# Patient Record
Sex: MALE | Race: BLACK OR AFRICAN AMERICAN | NOT HISPANIC OR LATINO | Employment: FULL TIME | URBAN - METROPOLITAN AREA
[De-identification: names, ages, dates, MRNs, and addresses within clinical notes are randomized per-mention and may not be internally consistent; named-entity substitution may affect disease eponyms.]

---

## 2021-05-23 ENCOUNTER — APPOINTMENT (EMERGENCY)
Dept: RADIOLOGY | Facility: HOSPITAL | Age: 31
End: 2021-05-23

## 2021-05-23 ENCOUNTER — APPOINTMENT (EMERGENCY)
Dept: CT IMAGING | Facility: HOSPITAL | Age: 31
End: 2021-05-23

## 2021-05-23 ENCOUNTER — HOSPITAL ENCOUNTER (EMERGENCY)
Facility: HOSPITAL | Age: 31
Discharge: HOME | End: 2021-05-23
Attending: EMERGENCY MEDICINE | Admitting: EMERGENCY MEDICINE

## 2021-05-23 VITALS
RESPIRATION RATE: 16 BRPM | OXYGEN SATURATION: 98 % | TEMPERATURE: 97.6 F | HEIGHT: 70 IN | HEART RATE: 70 BPM | WEIGHT: 235 LBS | BODY MASS INDEX: 33.64 KG/M2 | DIASTOLIC BLOOD PRESSURE: 71 MMHG | SYSTOLIC BLOOD PRESSURE: 123 MMHG
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
## 2021-05-23 DIAGNOSIS — T07.XXXA MULTIPLE ABRASIONS: ICD-10-CM

## 2021-05-23 DIAGNOSIS — S82.851A CLOSED RIGHT TRIMALLEOLAR FRACTURE, INITIAL ENCOUNTER: Primary | ICD-10-CM

## 2021-05-23 PROCEDURE — 73700 CT LOWER EXTREMITY W/O DYE: CPT

## 2021-05-23 PROCEDURE — 73610 X-RAY EXAM OF ANKLE: CPT

## 2021-05-23 PROCEDURE — 99285 EMERGENCY DEPT VISIT HI MDM: CPT | Performed by: EMERGENCY MEDICINE

## 2021-05-23 PROCEDURE — 99285 EMERGENCY DEPT VISIT HI MDM: CPT

## 2021-05-23 PROCEDURE — 71045 X-RAY EXAM CHEST 1 VIEW: CPT

## 2021-05-23 PROCEDURE — 76705 ECHO EXAM OF ABDOMEN: CPT | Performed by: EMERGENCY MEDICINE

## 2021-05-23 PROCEDURE — 93308 TTE F-UP OR LMTD: CPT | Performed by: EMERGENCY MEDICINE

## 2021-05-23 PROCEDURE — 29515 APPLICATION SHORT LEG SPLINT: CPT | Performed by: EMERGENCY MEDICINE

## 2021-05-23 RX ORDER — GINSENG 100 MG
2 CAPSULE ORAL ONCE
Status: COMPLETED | OUTPATIENT
Start: 2021-05-23 | End: 2021-05-23

## 2021-05-23 RX ORDER — OXYCODONE HYDROCHLORIDE AND ACETAMINOPHEN 5; 325 MG/1; MG/1
1 TABLET ORAL ONCE
Status: COMPLETED | OUTPATIENT
Start: 2021-05-23 | End: 2021-05-23

## 2021-05-23 RX ORDER — OXYCODONE HYDROCHLORIDE AND ACETAMINOPHEN 5; 325 MG/1; MG/1
1 TABLET ORAL EVERY 6 HOURS PRN
Qty: 15 TABLET | Refills: 0 | Status: SHIPPED | OUTPATIENT
Start: 2021-05-23 | End: 2021-06-03

## 2021-05-23 RX ORDER — IBUPROFEN 600 MG/1
600 TABLET ORAL ONCE
Status: COMPLETED | OUTPATIENT
Start: 2021-05-23 | End: 2021-05-23

## 2021-05-23 RX ORDER — IBUPROFEN 200 MG
800 TABLET ORAL 2 TIMES DAILY
COMMUNITY
End: 2021-06-07 | Stop reason: HOSPADM

## 2021-05-23 RX ADMIN — BACITRACIN 2 SMALL APPLICATION: 500 OINTMENT TOPICAL at 09:36

## 2021-05-23 RX ADMIN — OXYCODONE HYDROCHLORIDE AND ACETAMINOPHEN 1 TABLET: 5; 325 TABLET ORAL at 10:05

## 2021-05-23 RX ADMIN — IBUPROFEN 600 MG: 600 TABLET, FILM COATED ORAL at 09:01

## 2021-05-23 NOTE — Clinical Note
Grace Jay was seen and treated in our emergency department on 5/23/2021  No work until cleared by Family Doctor/Orthopedics        Diagnosis:     Pk    He may return on this date: If you have any questions or concerns, please don't hesitate to call        Yanira Reynaga, DO    ______________________________           _______________          _______________  Hospital Representative                              Date                                Time
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
## 2021-05-23 NOTE — DISCHARGE INSTRUCTIONS
You can take ibuprofen for pain    A prescription for Percocet is also available for breakthrough severe pain

## 2021-05-28 NOTE — TELEPHONE ENCOUNTER
Margrett Duverney was seen in the ER on May 23rd and was referred to Dr Keyna Bejarano  The phone room placed him with Dr Cherlyn Severin so he could get in sooner, but unfortunately Dr Cherlyn Severin does not see this kind of fracture and he needs to be seen by Dr Kenya Bejarano or a Trauma Doctor  The mistake of placing this Pt with Cherlyn Severin was realized days ago, but unfortunately the phone number in the chart was incorrect  We were able to find him eventually and correct it, but now he is out many days since being in the ER  Is there a way that you can get this PT in with Dr Kenya Bejarano on 6/1 at the [address] [place] [address]? The PT is made aware that they will get a call to schedule  Thank you!

## 2021-06-01 NOTE — TELEPHONE ENCOUNTER
Patient scheduled for 6/3 @ 745am in the [facility] office to see Dr Citlalli Vazquez

## 2021-06-03 VITALS
WEIGHT: 235 LBS | BODY MASS INDEX: 33.64 KG/M2 | HEIGHT: 70 IN | DIASTOLIC BLOOD PRESSURE: 80 MMHG | SYSTOLIC BLOOD PRESSURE: 130 MMHG

## 2021-06-03 DIAGNOSIS — Z71.6 ENCOUNTER FOR SMOKING CESSATION COUNSELING: ICD-10-CM

## 2021-06-03 DIAGNOSIS — S82.871A TRAUMATIC CLOSED DISPLACED FRACTURE OF TIBIAL PLAFOND, RIGHT, INITIAL ENCOUNTER: Primary | ICD-10-CM

## 2021-06-03 PROCEDURE — 99243 OFF/OP CNSLTJ NEW/EST LOW 30: CPT | Performed by: ORTHOPAEDIC SURGERY

## 2021-06-03 RX ORDER — CHLORHEXIDINE GLUCONATE 4 G/100ML
SOLUTION TOPICAL DAILY PRN
Status: CANCELLED | OUTPATIENT
Start: 2021-06-03

## 2021-06-03 RX ORDER — CEFAZOLIN SODIUM 2 G/50ML
2000 SOLUTION INTRAVENOUS ONCE
Status: CANCELLED | OUTPATIENT
Start: 2021-06-07 | End: 2021-06-03

## 2021-06-03 NOTE — LETTER
Jennifer 3, 2021     Breanne Snow DO  [address]    Patient: Laura Torres   YOB: 1990   Date of Visit: 6/3/2021       Dear Dr Brooklyn Rush: Thank you for referring Laura Torres to me for evaluation  Below are my notes for this consultation  If you have questions, please do not hesitate to call me  I look forward to following your patient along with you  Sincerely,        Cliff Perry MD        CC: No Recipients  Cliff Perry MD  6/3/2021  8:19 AM  Signed        VAHE Andrade  Attending, Orthopaedic Surgery  Foot and Ankle  [facility]        ORTHOPAEDIC FOOT AND ANKLE CLINIC VISIT-ANKLE FRACTURE     Assessment:     Encounter Diagnoses   Name Primary?  Closed trimalleolar fracture of right ankle, initial encounter     Encounter for smoking cessation counseling     Traumatic closed displaced fracture of tibial plafond, right, initial encounter Yes              Plan:   · The patient verbalized understanding of exam findings and treatment plan  We engaged in the shared decision-making process and treatment options were discussed at length with the patient  Surgical and conservative management discussed today along with risks and benefits  · The patient has an unstable tibial plafond fracture which is amenable to surgical fixation  · Consent signed in clinic today  · Discussed importance of smoking cessation to reduce risk of complications  · We will plan for surgery at the earliest mutually convenient time  · See back 3 weeks postop for suture removal and transition from splint to CAM boot    CONSENT FOR ANKLE FRACTURE OPEN REDUCTION INTERNAL FIXATION (ORIF): We have discussed the procedure with the patient in detail, including fixation of the fibula with a plate and screws as well as possible need for deltoid ligament repair and/or syndesmotic screw fixation    Potential syndesmotic screw breakage was explained as an anticipated sequela as well  Patient understands that there is no guarantee that the surgery will relieve all of their pain and also understands that there may be a prolonged course of protected weight-bearing status required which will restrict them from driving and other activities as discussed at today's visit  Patient recognizes that there are risks with surgery including bleeding, numbness, nerve irritation, wound complications, infection, continued pain, joint stiffness, malunion, nonunion, anesthetic complications, death, failure of procedure, development of arthritis and possible need for further surgery  The patient understands that there is no guarantee that this surgery will relieve all of His pain and symptoms  Patient understands that there is no guarantee that they will return to full function after the procedure  Patient has provided informed consent for the procedure  History of Present Illness:   Chief Complaint: Right tibial pilon fracture  Wilhelmena Prader is a 27 y o  male who is being seen for  right ankle injury  He sustained the injury 10 days ago  Pain is localized at diffuse ankle with minimal radiating and described as sharp and severe  Patient denies numbness, tingling or radicular pain  Denies history of neuropathy  Patient does smoke, does not have diabetes and does not take blood thinners  Patient denies family history of anesthesia complications and has not had any complications with anesthesia       Pain/symptom timing:  Worse during the day when active  Pain/symptom context:  Worse with activites and work  Pain/symptom modifying factors:  Rest makes better, activities make worse  Pain/symptom associated signs/symptoms: none    Prior treatment   · NSAIDsNo    · Injections No   · Bracing/Orthotics Yes   · Physical Therapy No     Orthopedic Surgical History:   See below    Past Medical, Surgical and Social History:  Past Medical History:  has no past medical history on file   Problem List: does not have any pertinent problems on file  Past Surgical History:  has no past surgical history on file  Family History: family history is not on file  Social History:  reports that he has been smoking cigarettes  He has been smoking about 1 00 pack per day  He has never used smokeless tobacco  He reports current alcohol use  He reports current drug use  Drug: Marijuana  Current Medications: has a current medication list which includes the following prescription(s): ibuprofen  Allergies: has No Known Allergies  Review of Systems:  General- denies fever/chills  HEENT- denies hearing loss or sore throat  Eyes- denies eye pain or visual disturbances, denies red eyes  Respiratory- denies cough or SOB  Cardio- denies chest pain or palpitations  GI- denies abdominal pain  Endocrine- denies urinary frequency  Urinary- denies pain with urination  Musculoskeletal- Negative except noted above  Skin- denies rashes or wounds  Neurological- denies dizziness or headache  Psychiatric- denies anxiety or difficulty concentrating    Physical Exam:   /80   Ht 5' 10" (1 778 m)   Wt 107 kg (235 lb)   BMI 33 72 kg/m²   General/Constitutional: No apparent distress: well-nourished and well developed  Eyes: normal ocular motion  Cardio: RRR, Normal S1S2, No m/r/g  Lymphatic: No appreciable lymphadenopathy  Respiratory: Non-labored breathing, CTA b/l no w/c/r  Vascular: No edema, swelling or tenderness, except as noted in detailed exam   Integumentary: No impressive skin lesions present, except as noted in detailed exam   Neuro: No ataxia or tremors noted  Psych: Normal mood and affect, oriented to person, place and time  Appropriate affect  Musculoskeletal: Normal, except as noted in detailed exam and in HPI      Examination    right    Gait Not assessed due to unstable ankle fracture   Musculoskeletal Tender to palpation at fracture site    Skin What can be seen in the splint is Normal       Nails Normal    Range of Motion  Not assessed due to unstable ankle fracture    Stability Unstable    Muscle Strength N/A tibialis anterior  N/A gastrocnemius-soleus  N/A posterior tibialis  N/A peroneal/eversion strength  5/5 EHL  5/5 FHL    Neurologic Normal    Sensation  Intact to light touch throughout sural, saphenous, superficial peroneal, deep peroneal and medial/lateral plantar nerve distributions  [place] 5 07 filament (10g) testing deferred  Cardiovascular Brisk capillary refill < 2 seconds,intact DP and PT pulses    Special Tests None      Imaging Studies:   3 views of the  right ankle were taken, reviewed and interpreted independently that demonstrate Posterior tibial plafond fracture with associated fibula and medial malleolus  Previous clubfoot sequelae noted  Reviewed by me personally  CT right ankle- consistent with above and in addition, 4th MT avulsion, medial cuneiform avulsion fracture dorsally  Reviewed by me personally  Scribe Attestation    I,:  Alvarado Zeng PA-C am acting as a scribe while in the presence of the attending physician :       I,:  Caryl Mccain MD personally performed the services described in this documentation    as scribed in my presence :             Sherral Mires Lachman, MD  Foot & Ankle Surgery   Department of [address] [place] [address]      I personally performed the service  Sherral Mires Lachman, MD

## 2021-06-03 NOTE — PATIENT INSTRUCTIONS
VAHE Green  Attending, [address] [place] [address] Office Phone: [phone] ? Fax: [phone]  [address] [place] [address] Office Phone: [phone] ? UZR:[phone]    : Pipo Keller) [place], [place]     Surgery Coordinators Carla Lorado: Valeria Mendoza, [address] [facility], [phone]  Surgery Coordinator Yandel:  Ashwini Garnett 32, [phone]  www [facility] [email or web address]   PRE-OPERATIVE AND POST-OPERATIVE INSTRUCTIONS    General Information:   Your surgery is with Dr Garret Nuno  Dates can change (although rare) depending on emergencies   Typical post operative visits are at the following intervals:  3 weeks post surgery(except 1 week for bunions and wound monitoring), 6 weeks post surgery, 3 months post surgery, 6 months post surgery, and then on a yearly basis  However, this may change based on Dr Anushka De Jesus recommendation   #1 post-operative rule for foot/ankle surgery:  ONCE YOU ARE OUT OF YOUR CAST AND/OR REMOVABLE BOOT, SWELLING MAY PERSIST FOR MANY MONTHS  YOU MIGHT ALSO EXPERIENCE A BLUISH DISCOLORATION OF YOUR LEG  THIS IS NORMAL AND PART OF THE USUAL POSTOPERATIVE EXPERIENCE  SMOKING:   Smoking results in incomplete healing of fractures (broken bones) and joints that my have been fused  Smoking and nicotine also prevents the growth of bone into ankle replacements and bone healing  It also slows the healing of muscles and skin (soft tissue)  Therefore, please do not have surgery if you continue to smoke  We reserve the right to cancel your surgery if we suspect that you are smoking  DO NOT use nicorette gum or other patches  Please find an alternative method to quit smoking before your surgery  Pre-Operative Information:   Surgery date and preoperative visits:  a   If you have medical problems, such as an abnormal EKG, history of BLOOD CLOT, ANEURYSM, and any other heart condition, please inform us so that we can get your medical clearance several weeks before the surgery  Please bring any important medical information, such as an EKG, chest x-ray, or echocardiogram, with you to ensure that your surgery will not be delayed  b  If needed, you will receive your preoperative appointments in the mail or by phone from our scheduling office  The location of the preoperative appointment will be given to you also   c  You may not eat after midnight the night before surgery  If you do, your surgery will be cancelled  d   Karo Jewell will receive a phone call from your surgery center the day before your surgery (if your surgery is on a Monday, you will get a call the Friday before)  If you do not hear from someone by 4pm the day before your surgery, please call the Surgical coordinator (number above) to notify us   e  Start taking Vitamin D3 4000 units per day and Calcium 1200mg per day immediately  You will continue this until your 3 month post-op visit  These are over the counter and available at all pharmacies and supermarkets  f  FOR THOSE HAVING SURGERY AT [address] [place] [address] WILL NEED CRUTCHES OR A ROLLING WALKER AFTER SURGERY, ASK FOR A PRESCRIPTION FOR THIS FROM OUR OFFICE TODAY  THIS CANNOT BE HANDLED THE DAY OF SURGERY AS [facility] DOES NOT STOCK THESE   Because bacterial can often enter any defect in the skin, it is important to avoid any cuts before surgery  Any breaks in the skin on the leg will often result in your surgery being postponed  Please avoid going on a very long walk the day prior to surgery, or doing other activities that could lead to irritation of the skin, including yard work, extra athletic activity, or shaving  This could result in surgery cancellation   You MUST be fasting the day of your surgery    Therefore, please do not consume any foot or beverage after midnight the night before surgery  The morning of surgery you may take your usual medications with a sip of water   It is important not to take anti-inflammatory medication like Ibuprofen, Motrin, Naproxen (Aleve), or Aspirin 7-10 days before surgery because they will make you bleed more than usual   Vitamin, E, Plavix and Coumadin also have the same effect  Stop Aspirin and Vitamin E two weeks before surgery  YOUR MEDICAL DOCTOR SHOULD TELL YOU WHEN TO STOP COUMADIN OR PLAVIX   If your surgery involves any bone healing, please do not take anti-inflammatories for at least 6 weeks after surgery  This can impede bone healing (ibuprofen, Aleve, Relafen, iodine)  Tylenol is fine to take  PREOPERATIVE BATHING INSTRUCTIONS:     Before your surgery, bathe with Hibiclens (4% Chlorhexidene) as instructed below  This skin cleanser will help reduce the bacteria on your skin before surgery  To avoid irritating your eyes, do not apply Hibiclens above the level of your neck   o On the evening before AND the morning of surgery, bathe your entire body except the face and scalp, then rinse freely  o DO NOT apply to your face or scalp, as Hibiclens can irritate your eyes   Purchasing information:   Hibiclens is available without a prescription at [facility]  ADDITIONAL INSTRUCTIONS:  PATIENTS HAVING FOOT/ANKLE SURGERY     In preparation for your upcoming surgery, we kindly request and advise the following:   Notify our office if you are taking any of the following:  Coumadin (warfarin):  Persantine (dipyridamole); Pletal (cilostazol); Plavix (clopidogrel); Ticlid (ticlopidine); Agrylin (anagrelide); Aggrenox (dipyridamole and aspirin) or other blood thinners,   In addition, stop taking Vitamin E and herbal supplements   Do not schedule any elective dental work for at least 6 months after surgery    If you had an ankle replacement, you will need to take antibiotics before any future dental procedures  Your dentist or our office can prescribe these for you  1000mg of Amoxicillin 1 hour prior to any dental procedure is the recommended dosing  THREE RULES:    1  After surgery you will most likely be given the instructions KEEP YOUR TOES ABOVE YOUR NOSE    This means that you MUST have your feet elevated higher than your heart  Keeping your toes above your nose helps to heal the muscles and skin (soft tissues) by reducing swelling in your leg  This position also helps to prevent infection, and is very important in avoiding deep venous thrombosis (blood clots)  2  In order to keep the blood circulating in your legs and in order to avoid deep vein   thrombosis (blood clots), we ask patients to GET UP ONCE AN HOUR during the day  This means you should at least cross the room and come back  It does not mean you have to be up for long periods of time  In most cases we will not have people immediately put any weight on their operated part  This is important to prevent loosening of metal or other devices holding the bones together  It also prevents irritation of the soft tissues which can lead to prolonged healing  When we say get up once an hour, please walk, hop or move with an assisted device  This is important! 3  Do not do any excessive walking during the first few days after surgery  Recovering from surgery is a full-time task for the patient  Postoperative care is important to avoid irritating the skin incision, which can lead to infection  Please do not plan activities or go out of town for several weeks after surgery  If you are unsure about your future activities, please schedule surgery only when you know it is acceptable for you  Scheduling surgery and then canceling the date, prevents other people from having surgery on that date as it takes time to line everything up effectively    If you cancel your surgery the week of your planned surgery, we reserve the right to cancel all future surgical procedures  THE DAY OF SURGERY:     Arrival to the hospital or outpatient surgical center on time is imperative  If you arrive late, then your surgery will be cancelled  You MUST have a family member/friend bring you, stay with you throughout the DURATION of your surgery, and drive you home   You MUST be fasting the day of your surgery  Therefore, do not consume any food or beverage after midnight the night before surgery  At your pre-operative visit with the anesthesia staff, or during your phone screen, a nurse will instruct you what medications you will need to take the day of surgery   MAKE SURE THAT THE PHARMACY LISTED IN THE ELECTRONIC MEDICAL RECORD (EPIC) IS YOUR PREFERRED PHARMACY  For example, if you are staying with family or a friend, and will not be near your preferred pharmacy, YOU MUST, tell the nurses checking you in the day of surgery so that this can be changed in the system  If your prescriptions are sent to a pharmacy, this cannot be changed  AFTER YOUR SURGERY:   Bleeding through the bandage almost always occurs  Do not let this alarm you  Simply add more gauze or a towel, call us, and come in for a dressing change  If you think it is excessive, contact us immediately or go to the local emergency room   Do not get the bandage wet  Showering is possible with plastic protectors  Be very careful, as the bathroom can be wet and slippery  If you do get your dressing wet, it should be changed immediately  Please contact us   ONCE YOUR ARE OUT OF YOUR CAST AND/OR REMOVABLE BOOT, SWELLING MAY PERSIST FOR MANY MONTHS  YOU MIGHT ALSO EXPERIENCE A BLUISH DISCOLORATION OF YOUR LEG  THIS IS NORMAL AND PART OF THE USUAL POSTOPERATIVE EXPERIENCE  WEARING COMPRESSION HOSE (ELASTIC STOCKINGS) CAN HELP AVOID SOME OF THIS SWELLING        DRESSING:   The purpose of the surgical dressing is to keep your wound and the surgical site protected from the environment  Most dressings contain splints, which help to hold your foot and ankle in a corrected position, and also allow the surgical site to heal properly  Dressings will remain in place and undisturbed until the first postop visit  If you have a drain in place, this will need to be removed in 1-3 days after surgery  The time for the drain to be pulled will be written on your discharge instruction sheet  CAST  INSTRUCTIONS:  You may or may not get a cast following surgery  If you do, pay close attention to the following:     After application of a splint or cast, it is very important to elevate your leg for 24 to 72 hours  The injured area should be elevated well above the heart  Remember Toes above your Nose  Rest and elevation greatly reduce pain and speed the healing process by minimizing early swelling  CALL YOUR DOCTORS OFFICE OR VISIT LOCATION EMERGENCY ROOM IF YOU HAVE ANY OF THE FOLLOWING:     Significant increased pain, which may be caused by swelling, and the feeling that the splint or cast is too tight   Numbness and tingling in your hand or foot, which may be caused by too much pressure on the nerves   Burning and stinging, which may be caused by too much pressure on the skin   Excessive swelling below the cast, which may mean the cast is slowing your blood circulation   Loss of active movement of toes, which request an urgent evaluation   Loss of capillary refill  Pinch the tip of toes and carolyne the skin  Release pressure and if the skin does not return pink then call the office immediately  DO NOT GET YOUR CAST WET  Bacteria thrive in moist dark areas  We do not want this  If your cast becomes wet, return to the office and we will apply another one  PAIN AFTER SURGERY:  Narcotic pain medication can and will depress your respiratory system if taken in excess  The goal of pain management with narcotics is to be comfortable not pain free    If you take enough narcotics to be pain free then you run the risk of stopping breathing  If this happens, call 911 immediately!  Pain in the heel is often  caused by pressure from the weight of your foot on the bed  Make sure your heel is suspended off the bed by keeping a pillow underneath your calf not your knee  Medications: You will be given narcotic pain medication  Do NOT drive while taking narcotic medications  Medications such as Darvocet, Percocet, Vicoden or Tylenol #3, also contain acetaminophen (Tylenol)  Do not take acetaminophen or Tylenol from home when taking theses medications  When you fill your prescription, you may ask the pharmacist if your pain medication has acetaminophen/Tylenol in it  It is okay to take Tylenol with Oxycontin/Oxycodone  Should you have pain after taking your prescription medication, ibuprophen (Motrin, Advil, and Alleve) is a common over the counter preparation and may often be taken with the prescription pain medication as long as you take them with food  These medications can irritate the stomach lining  Unless you are allergic to aspirin or currently taking a blood thinner, Dr Yeny Diaz patients are requested to take one 325 mg aspirin every 12 hours until you are back to walking normally after surgery (This can be up to 6 weeks)  Narcotic medications commonly cause nausea  Taking them with food will decrease this side effect  If you are having extreme nausea, please contact us for an alternative medication or for something that can be taken with this medication to decrease the nausea  Also, narcotic medications frequently cause constipation  An increase of fiber, fruits and vegetables in your diet may alleviate this problem, or if necessary, you may use an over-the-counter medication such as senekot, colace, or Fibercon for constipation problems  You should resume all medications you were taking prior to the surgery unless otherwise specified       Activity:   Because of your recent foot surgery, your activity level will decrease  You will need to elevate your foot ABOVE the level of your heart for a minimum of four days  The length of time necessary for the swelling to go down, and for your wounds to heal properly depends greatly on your efforts here  Elevation is extremely important to avoid compromising the blood supply to your foot  Remember when your foot is down it will swell, which will increase pain and slow healing  Wiggle your toes frequently if possible  If you go home with a regional block, (a type of anesthesia) the foot and leg will be numb  Think of ways to get into your house and around the house until the block wears off  Keep in mind that it may be a legal issue if you drive while in a cast or splint, especially when the splint is on the right foot  You may call the Department of [employer] to schedule a road test if you have adaptive equipment applied to your car  The amount of weight you are allowed to bear on your foot will be written on your discharge sheet filled out at the time of surgery  The following is an explanation of the possibilities:     Non-weight bearing: You are to put NO weight whatsoever on your foot  When using crutches or a walker, your foot should not touch the ground, except when you are standing  Then, it may rest on the ground  If you are to be non-weight bearing, and you are not compliant, you could compromise the surgery  Some of our patients have been requesting prescriptions for a roll-a-bout knee scooter  [employer] and other insurances have been denying these claims, and you may either have to rent one or pay out of pocket to purchase one  THIS SHOULD BE PURCHASED PRIOR TO THE SURGERY AND YOU SHOULD BRING IT WITH YOU THE DAY OF THE SURGERY TO AIDE IN GETTING FROM THE CAR INTO THE HOUSE AFTER SURGERY

## 2021-06-03 NOTE — PROGRESS NOTES
VAHE Kramer  Attending, Orthopaedic Surgery  Foot and Ankle  [facility]        ORTHOPAEDIC FOOT AND ANKLE CLINIC VISIT-ANKLE FRACTURE     Assessment:     Encounter Diagnoses   Name Primary?  Closed trimalleolar fracture of right ankle, initial encounter     Encounter for smoking cessation counseling     Traumatic closed displaced fracture of tibial plafond, right, initial encounter Yes              Plan:   · The patient verbalized understanding of exam findings and treatment plan  We engaged in the shared decision-making process and treatment options were discussed at length with the patient  Surgical and conservative management discussed today along with risks and benefits  · The patient has an unstable tibial plafond fracture which is amenable to surgical fixation  · Consent signed in clinic today  · Discussed importance of smoking cessation to reduce risk of complications  · We will plan for surgery at the earliest mutually convenient time  · See back 3 weeks postop for suture removal and transition from splint to CAM boot    CONSENT FOR ANKLE FRACTURE OPEN REDUCTION INTERNAL FIXATION (ORIF): We have discussed the procedure with the patient in detail, including fixation of the fibula with a plate and screws as well as possible need for deltoid ligament repair and/or syndesmotic screw fixation  Potential syndesmotic screw breakage was explained as an anticipated sequela as well  Patient understands that there is no guarantee that the surgery will relieve all of their pain and also understands that there may be a prolonged course of protected weight-bearing status required which will restrict them from driving and other activities as discussed at today's visit   Patient recognizes that there are risks with surgery including bleeding, numbness, nerve irritation, wound complications, infection, continued pain, joint stiffness, malunion, nonunion, anesthetic complications, death, failure of procedure, development of arthritis and possible need for further surgery  The patient understands that there is no guarantee that this surgery will relieve all of His pain and symptoms  Patient understands that there is no guarantee that they will return to full function after the procedure  Patient has provided informed consent for the procedure  History of Present Illness:   Chief Complaint: Right tibial pilon fracture  Xochitl Cohn is a 27 y o  male who is being seen for  right ankle injury  He sustained the injury 10 days ago  Pain is localized at diffuse ankle with minimal radiating and described as sharp and severe  Patient denies numbness, tingling or radicular pain  Denies history of neuropathy  Patient does smoke, does not have diabetes and does not take blood thinners  Patient denies family history of anesthesia complications and has not had any complications with anesthesia  Pain/symptom timing:  Worse during the day when active  Pain/symptom context:  Worse with activites and work  Pain/symptom modifying factors:  Rest makes better, activities make worse  Pain/symptom associated signs/symptoms: none    Prior treatment   · NSAIDsNo    · Injections No   · Bracing/Orthotics Yes   · Physical Therapy No     Orthopedic Surgical History:   See below    Past Medical, Surgical and Social History:  Past Medical History:  has no past medical history on file  Problem List: does not have any pertinent problems on file  Past Surgical History:  has no past surgical history on file  Family History: family history is not on file  Social History:  reports that he has been smoking cigarettes  He has been smoking about 1 00 pack per day  He has never used smokeless tobacco  He reports current alcohol use  He reports current drug use  Drug: Marijuana    Current Medications: has a current medication list which includes the following prescription(s): ibuprofen  Allergies: has No Known Allergies  Review of Systems:  General- denies fever/chills  HEENT- denies hearing loss or sore throat  Eyes- denies eye pain or visual disturbances, denies red eyes  Respiratory- denies cough or SOB  Cardio- denies chest pain or palpitations  GI- denies abdominal pain  Endocrine- denies urinary frequency  Urinary- denies pain with urination  Musculoskeletal- Negative except noted above  Skin- denies rashes or wounds  Neurological- denies dizziness or headache  Psychiatric- denies anxiety or difficulty concentrating    Physical Exam:   /80   Ht 5' 10" (1 778 m)   Wt 107 kg (235 lb)   BMI 33 72 kg/m²   General/Constitutional: No apparent distress: well-nourished and well developed  Eyes: normal ocular motion  Cardio: RRR, Normal S1S2, No m/r/g  Lymphatic: No appreciable lymphadenopathy  Respiratory: Non-labored breathing, CTA b/l no w/c/r  Vascular: No edema, swelling or tenderness, except as noted in detailed exam   Integumentary: No impressive skin lesions present, except as noted in detailed exam   Neuro: No ataxia or tremors noted  Psych: Normal mood and affect, oriented to person, place and time  Appropriate affect  Musculoskeletal: Normal, except as noted in detailed exam and in HPI  Examination    right    Gait Not assessed due to unstable ankle fracture   Musculoskeletal Tender to palpation at fracture site    Skin What can be seen in the splint is Normal       Nails Normal    Range of Motion  Not assessed due to unstable ankle fracture    Stability Unstable    Muscle Strength N/A tibialis anterior  N/A gastrocnemius-soleus  N/A posterior tibialis  N/A peroneal/eversion strength  5/5 EHL  5/5 FHL    Neurologic Normal    Sensation  Intact to light touch throughout sural, saphenous, superficial peroneal, deep peroneal and medial/lateral plantar nerve distributions  [place] 5 07 filament (10g) testing deferred      Cardiovascular Brisk capillary refill < 2 seconds,intact DP and PT pulses    Special Tests None      Imaging Studies:   3 views of the  right ankle were taken, reviewed and interpreted independently that demonstrate Posterior tibial plafond fracture with associated fibula and medial malleolus  Previous clubfoot sequelae noted  Reviewed by me personally  CT right ankle- consistent with above and in addition, 4th MT avulsion, medial cuneiform avulsion fracture dorsally  Reviewed by me personally  Scribe Attestation    I,:  Mima Garcia PA-C am acting as a scribe while in the presence of the attending physician :       I,:  Apryl Anthony MD personally performed the services described in this documentation    as scribed in my presence :             Charlynne Ruts Lachman, MD  Foot & Ankle Surgery   Department of [place] [address]      I personally performed the service  Charlynne Ruts Lachman, MD

## 2021-06-03 NOTE — H&P (VIEW-ONLY)
VAHE Morgan  Attending, Orthopaedic Surgery  Foot and Ankle  [facility]        ORTHOPAEDIC FOOT AND ANKLE CLINIC VISIT-ANKLE FRACTURE     Assessment:     Encounter Diagnoses   Name Primary?  Closed trimalleolar fracture of right ankle, initial encounter     Encounter for smoking cessation counseling     Traumatic closed displaced fracture of tibial plafond, right, initial encounter Yes              Plan:   · The patient verbalized understanding of exam findings and treatment plan  We engaged in the shared decision-making process and treatment options were discussed at length with the patient  Surgical and conservative management discussed today along with risks and benefits  · The patient has an unstable tibial plafond fracture which is amenable to surgical fixation  · Consent signed in clinic today  · Discussed importance of smoking cessation to reduce risk of complications  · We will plan for surgery at the earliest mutually convenient time  · See back 3 weeks postop for suture removal and transition from splint to CAM boot    CONSENT FOR ANKLE FRACTURE OPEN REDUCTION INTERNAL FIXATION (ORIF): We have discussed the procedure with the patient in detail, including fixation of the fibula with a plate and screws as well as possible need for deltoid ligament repair and/or syndesmotic screw fixation  Potential syndesmotic screw breakage was explained as an anticipated sequela as well  Patient understands that there is no guarantee that the surgery will relieve all of their pain and also understands that there may be a prolonged course of protected weight-bearing status required which will restrict them from driving and other activities as discussed at today's visit   Patient recognizes that there are risks with surgery including bleeding, numbness, nerve irritation, wound complications, infection, continued pain, joint stiffness, malunion, nonunion, anesthetic complications, death, failure of procedure, development of arthritis and possible need for further surgery  The patient understands that there is no guarantee that this surgery will relieve all of His pain and symptoms  Patient understands that there is no guarantee that they will return to full function after the procedure  Patient has provided informed consent for the procedure  History of Present Illness:   Chief Complaint: Right tibial pilon fracture  Tia Arteaga is a 27 y o  male who is being seen for  right ankle injury  He sustained the injury 10 days ago  Pain is localized at diffuse ankle with minimal radiating and described as sharp and severe  Patient denies numbness, tingling or radicular pain  Denies history of neuropathy  Patient does smoke, does not have diabetes and does not take blood thinners  Patient denies family history of anesthesia complications and has not had any complications with anesthesia  Pain/symptom timing:  Worse during the day when active  Pain/symptom context:  Worse with activites and work  Pain/symptom modifying factors:  Rest makes better, activities make worse  Pain/symptom associated signs/symptoms: none    Prior treatment   · NSAIDsNo    · Injections No   · Bracing/Orthotics Yes   · Physical Therapy No     Orthopedic Surgical History:   See below    Past Medical, Surgical and Social History:  Past Medical History:  has no past medical history on file  Problem List: does not have any pertinent problems on file  Past Surgical History:  has no past surgical history on file  Family History: family history is not on file  Social History:  reports that he has been smoking cigarettes  He has been smoking about 1 00 pack per day  He has never used smokeless tobacco  He reports current alcohol use  He reports current drug use  Drug: Marijuana    Current Medications: has a current medication list which includes the following prescription(s): ibuprofen  Allergies: has No Known Allergies  Review of Systems:  General- denies fever/chills  HEENT- denies hearing loss or sore throat  Eyes- denies eye pain or visual disturbances, denies red eyes  Respiratory- denies cough or SOB  Cardio- denies chest pain or palpitations  GI- denies abdominal pain  Endocrine- denies urinary frequency  Urinary- denies pain with urination  Musculoskeletal- Negative except noted above  Skin- denies rashes or wounds  Neurological- denies dizziness or headache  Psychiatric- denies anxiety or difficulty concentrating    Physical Exam:   /80   Ht 5' 10" (1 778 m)   Wt 107 kg (235 lb)   BMI 33 72 kg/m²   General/Constitutional: No apparent distress: well-nourished and well developed  Eyes: normal ocular motion  Cardio: RRR, Normal S1S2, No m/r/g  Lymphatic: No appreciable lymphadenopathy  Respiratory: Non-labored breathing, CTA b/l no w/c/r  Vascular: No edema, swelling or tenderness, except as noted in detailed exam   Integumentary: No impressive skin lesions present, except as noted in detailed exam   Neuro: No ataxia or tremors noted  Psych: Normal mood and affect, oriented to person, place and time  Appropriate affect  Musculoskeletal: Normal, except as noted in detailed exam and in HPI  Examination    right    Gait Not assessed due to unstable ankle fracture   Musculoskeletal Tender to palpation at fracture site    Skin What can be seen in the splint is Normal       Nails Normal    Range of Motion  Not assessed due to unstable ankle fracture    Stability Unstable    Muscle Strength N/A tibialis anterior  N/A gastrocnemius-soleus  N/A posterior tibialis  N/A peroneal/eversion strength  5/5 EHL  5/5 FHL    Neurologic Normal    Sensation  Intact to light touch throughout sural, saphenous, superficial peroneal, deep peroneal and medial/lateral plantar nerve distributions  [place] 5 07 filament (10g) testing deferred      Cardiovascular Brisk capillary refill < 2 seconds,intact DP and PT pulses    Special Tests None      Imaging Studies:   3 views of the  right ankle were taken, reviewed and interpreted independently that demonstrate Posterior tibial plafond fracture with associated fibula and medial malleolus  Previous clubfoot sequelae noted  Reviewed by me personally  CT right ankle- consistent with above and in addition, 4th MT avulsion, medial cuneiform avulsion fracture dorsally  Reviewed by me personally  Scribe Attestation    I,:  Summer Medina PA-C am acting as a scribe while in the presence of the attending physician :       I,:  Zachary Kramer MD personally performed the services described in this documentation    as scribed in my presence :             Marlena Slay Lachman, MD  Foot & Ankle Surgery   Department of [place] [address]      I personally performed the service  Marlena Slay Lachman, MD

## 2021-06-03 NOTE — PRE-PROCEDURE INSTRUCTIONS
Pre-Surgery Instructions:   Medication Instructions    ibuprofen (MOTRIN) 200 mg tablet Patient was instructed by Physician and understands   oxyCODONE-acetaminophen (PERCOCET) 5-325 mg per tablet Instructed patient per Anesthesia Guidelines  Pre op and bathing instructions reviewed  Pt has hibiclens  Pt  Verbalized understanding of current visitor restrictions  Pt  Verbalized an understanding of all instructions reviewed and offers no concerns at this time  Instructed to avoid all ASA/NSAIDs and OTC Vit/Supp from now until after surgery   Tylenol ok prn   DOS may take Percocet with a few sips of H2O PRN

## 2021-06-06 ENCOUNTER — ANESTHESIA EVENT (OUTPATIENT)
Dept: PERIOP | Facility: HOSPITAL | Age: 31
End: 2021-06-06

## 2021-06-06 NOTE — ANESTHESIA PREPROCEDURE EVALUATION
Procedure:  OPEN REDUCTION W/ INTERNAL FIXATION (ORIF) ANKLE (Right Ankle)    Relevant Problems   No relevant active problems   SMOKER 1PPD     Physical Exam    Airway    Mallampati score: II  TM Distance: >3 FB  Neck ROM: full     Dental   No notable dental hx     Cardiovascular      Pulmonary      Other Findings        Anesthesia Plan  ASA Score- 2     Anesthesia Type- general with ASA Monitors  Additional Monitors:   Airway Plan: LMA  Comment: Popliteal and Adductor Blocks planned  Plan Factors-Exercise tolerance (METS): >4 METS  Chart reviewed  Patient is a current smoker  Patient not instructed to abstain from smoking on day of procedure  Patient did not smoke on day of surgery  Induction- intravenous  Postoperative Plan-     Informed Consent- Anesthetic plan and risks discussed with patient  I personally reviewed this patient with the CRNA  Discussed and agreed on the Anesthesia Plan with the CRNA  Modesto Puentes Discussed with Patient the procedure for POPLITEAL AND ADDUCTOR CANAL BLOCKS, side effects including extended numbness of the extremity and potential for an incomplete Block  All questions answered  Consent given

## 2021-06-07 ENCOUNTER — HOSPITAL ENCOUNTER (OUTPATIENT)
Facility: HOSPITAL | Age: 31
Setting detail: OUTPATIENT SURGERY
Discharge: HOME | End: 2021-06-07
Attending: ORTHOPAEDIC SURGERY | Admitting: ORTHOPAEDIC SURGERY

## 2021-06-07 ENCOUNTER — APPOINTMENT (OUTPATIENT)
Dept: RADIOLOGY | Facility: HOSPITAL | Age: 31
End: 2021-06-07

## 2021-06-07 ENCOUNTER — ANESTHESIA (OUTPATIENT)
Dept: PERIOP | Facility: HOSPITAL | Age: 31
End: 2021-06-07

## 2021-06-07 VITALS
OXYGEN SATURATION: 97 % | TEMPERATURE: 97.7 F | SYSTOLIC BLOOD PRESSURE: 132 MMHG | WEIGHT: 231 LBS | HEIGHT: 70 IN | HEART RATE: 62 BPM | DIASTOLIC BLOOD PRESSURE: 96 MMHG | BODY MASS INDEX: 33.07 KG/M2 | RESPIRATION RATE: 13 BRPM

## 2021-06-07 DIAGNOSIS — S82.871A TRAUMATIC CLOSED DISPLACED FRACTURE OF TIBIAL PLAFOND, RIGHT, INITIAL ENCOUNTER: Primary | ICD-10-CM

## 2021-06-07 PROCEDURE — 27827 TREAT LOWER LEG FRACTURE: CPT | Performed by: ORTHOPAEDIC SURGERY

## 2021-06-07 PROCEDURE — C1769 GUIDE WIRE: HCPCS | Performed by: ORTHOPAEDIC SURGERY

## 2021-06-07 PROCEDURE — 27827 TREAT LOWER LEG FRACTURE: CPT | Performed by: PHYSICIAN ASSISTANT

## 2021-06-07 PROCEDURE — C9290 INJ, BUPIVACAINE LIPOSOME: HCPCS | Performed by: PHYSICIAN ASSISTANT

## 2021-06-07 PROCEDURE — 27814 TREATMENT OF ANKLE FRACTURE: CPT | Performed by: PHYSICIAN ASSISTANT

## 2021-06-07 PROCEDURE — C1713 ANCHOR/SCREW BN/BN,TIS/BN: HCPCS | Performed by: ORTHOPAEDIC SURGERY

## 2021-06-07 PROCEDURE — 27814 TREATMENT OF ANKLE FRACTURE: CPT | Performed by: ORTHOPAEDIC SURGERY

## 2021-06-07 PROCEDURE — 73610 X-RAY EXAM OF ANKLE: CPT

## 2021-06-07 DEVICE — 2.7MM CORTEX SCREW SLF-TPNG WITH T8 STARDRIVE RECESS/44MM: Type: IMPLANTABLE DEVICE | Site: ANKLE | Status: FUNCTIONAL

## 2021-06-07 DEVICE — 2.7MM CORTEX SCREW SLF-TPNG WITH T8 STARDRIVE RECESS/48MM: Type: IMPLANTABLE DEVICE | Site: ANKLE | Status: FUNCTIONAL

## 2021-06-07 DEVICE — 2.7MM LCP(TM) L-PLATE RIGHT 2H HEAD/3H SHAFT
Type: IMPLANTABLE DEVICE | Site: ANKLE | Status: FUNCTIONAL
Brand: LCP

## 2021-06-07 DEVICE — 2.7MM CORTEX SCREW SLF-TPNG WITH T8 STARDRIVE RECESS 38MM: Type: IMPLANTABLE DEVICE | Site: ANKLE | Status: FUNCTIONAL

## 2021-06-07 DEVICE — 2.7MM CORTEX SCREW SLF-TPNG WITH T8 STARDRIVE RECESS 36MM: Type: IMPLANTABLE DEVICE | Site: ANKLE | Status: FUNCTIONAL

## 2021-06-07 RX ORDER — ROCURONIUM BROMIDE 10 MG/ML
INJECTION, SOLUTION INTRAVENOUS AS NEEDED
Status: DISCONTINUED | OUTPATIENT
Start: 2021-06-07 | End: 2021-06-07

## 2021-06-07 RX ORDER — CEFAZOLIN SODIUM 2 G/50ML
2000 SOLUTION INTRAVENOUS ONCE
Status: DISCONTINUED | OUTPATIENT
Start: 2021-06-07 | End: 2021-06-07 | Stop reason: HOSPADM

## 2021-06-07 RX ORDER — CEFAZOLIN SODIUM 1 G/3ML
INJECTION, POWDER, FOR SOLUTION INTRAMUSCULAR; INTRAVENOUS AS NEEDED
Status: DISCONTINUED | OUTPATIENT
Start: 2021-06-07 | End: 2021-06-07

## 2021-06-07 RX ORDER — ROPIVACAINE HYDROCHLORIDE 2 MG/ML
INJECTION, SOLUTION EPIDURAL; INFILTRATION; PERINEURAL AS NEEDED
Status: DISCONTINUED | OUTPATIENT
Start: 2021-06-07 | End: 2021-06-07

## 2021-06-07 RX ORDER — ONDANSETRON 4 MG/1
4 TABLET, FILM COATED ORAL EVERY 8 HOURS PRN
Qty: 20 TABLET | Refills: 0 | Status: SHIPPED | OUTPATIENT
Start: 2021-06-07

## 2021-06-07 RX ORDER — BUPIVACAINE HYDROCHLORIDE 2.5 MG/ML
INJECTION, SOLUTION EPIDURAL; INFILTRATION; INTRACAUDAL AS NEEDED
Status: DISCONTINUED | OUTPATIENT
Start: 2021-06-07 | End: 2021-06-07 | Stop reason: HOSPADM

## 2021-06-07 RX ORDER — FENTANYL CITRATE/PF 50 MCG/ML
25 SYRINGE (ML) INJECTION
Status: DISCONTINUED | OUTPATIENT
Start: 2021-06-07 | End: 2021-06-07 | Stop reason: HOSPADM

## 2021-06-07 RX ORDER — SODIUM CHLORIDE, SODIUM LACTATE, POTASSIUM CHLORIDE, CALCIUM CHLORIDE 600; 310; 30; 20 MG/100ML; MG/100ML; MG/100ML; MG/100ML
125 INJECTION, SOLUTION INTRAVENOUS CONTINUOUS
Status: DISCONTINUED | OUTPATIENT
Start: 2021-06-07 | End: 2021-06-07 | Stop reason: HOSPADM

## 2021-06-07 RX ORDER — OXYCODONE HYDROCHLORIDE 5 MG/1
5 TABLET ORAL EVERY 4 HOURS PRN
Qty: 30 TABLET | Refills: 0 | Status: SHIPPED | OUTPATIENT
Start: 2021-06-07 | End: 2021-06-12

## 2021-06-07 RX ORDER — ASPIRIN 325 MG
325 TABLET, DELAYED RELEASE (ENTERIC COATED) ORAL 2 TIMES DAILY
Qty: 84 TABLET | Refills: 0 | Status: SHIPPED | OUTPATIENT
Start: 2021-06-07

## 2021-06-07 RX ORDER — ONDANSETRON 2 MG/ML
4 INJECTION INTRAMUSCULAR; INTRAVENOUS ONCE AS NEEDED
Status: DISCONTINUED | OUTPATIENT
Start: 2021-06-07 | End: 2021-06-07 | Stop reason: HOSPADM

## 2021-06-07 RX ORDER — VANCOMYCIN HYDROCHLORIDE 1 G/20ML
INJECTION, POWDER, LYOPHILIZED, FOR SOLUTION INTRAVENOUS AS NEEDED
Status: DISCONTINUED | OUTPATIENT
Start: 2021-06-07 | End: 2021-06-07 | Stop reason: HOSPADM

## 2021-06-07 RX ORDER — ONDANSETRON 2 MG/ML
INJECTION INTRAMUSCULAR; INTRAVENOUS AS NEEDED
Status: DISCONTINUED | OUTPATIENT
Start: 2021-06-07 | End: 2021-06-07

## 2021-06-07 RX ORDER — GLYCOPYRROLATE 0.2 MG/ML
INJECTION INTRAMUSCULAR; INTRAVENOUS AS NEEDED
Status: DISCONTINUED | OUTPATIENT
Start: 2021-06-07 | End: 2021-06-07

## 2021-06-07 RX ORDER — FENTANYL CITRATE 50 UG/ML
INJECTION, SOLUTION INTRAMUSCULAR; INTRAVENOUS AS NEEDED
Status: DISCONTINUED | OUTPATIENT
Start: 2021-06-07 | End: 2021-06-07

## 2021-06-07 RX ORDER — SUCCINYLCHOLINE/SOD CL,ISO/PF 100 MG/5ML
SYRINGE (ML) INTRAVENOUS AS NEEDED
Status: DISCONTINUED | OUTPATIENT
Start: 2021-06-07 | End: 2021-06-07

## 2021-06-07 RX ORDER — MIDAZOLAM HYDROCHLORIDE 2 MG/2ML
INJECTION, SOLUTION INTRAMUSCULAR; INTRAVENOUS AS NEEDED
Status: DISCONTINUED | OUTPATIENT
Start: 2021-06-07 | End: 2021-06-07

## 2021-06-07 RX ORDER — DEXAMETHASONE SODIUM PHOSPHATE 4 MG/ML
INJECTION, SOLUTION INTRA-ARTICULAR; INTRALESIONAL; INTRAMUSCULAR; INTRAVENOUS; SOFT TISSUE AS NEEDED
Status: DISCONTINUED | OUTPATIENT
Start: 2021-06-07 | End: 2021-06-07

## 2021-06-07 RX ORDER — PROPOFOL 10 MG/ML
INJECTION, EMULSION INTRAVENOUS AS NEEDED
Status: DISCONTINUED | OUTPATIENT
Start: 2021-06-07 | End: 2021-06-07

## 2021-06-07 RX ORDER — LIDOCAINE HYDROCHLORIDE 10 MG/ML
0.5 INJECTION, SOLUTION EPIDURAL; INFILTRATION; INTRACAUDAL; PERINEURAL ONCE AS NEEDED
Status: DISCONTINUED | OUTPATIENT
Start: 2021-06-07 | End: 2021-06-07 | Stop reason: HOSPADM

## 2021-06-07 RX ORDER — ROPIVACAINE HYDROCHLORIDE 5 MG/ML
INJECTION, SOLUTION EPIDURAL; INFILTRATION; PERINEURAL AS NEEDED
Status: DISCONTINUED | OUTPATIENT
Start: 2021-06-07 | End: 2021-06-07

## 2021-06-07 RX ADMIN — ONDANSETRON 4 MG: 2 INJECTION INTRAMUSCULAR; INTRAVENOUS at 11:32

## 2021-06-07 RX ADMIN — MIDAZOLAM 1 MG: 1 INJECTION INTRAMUSCULAR; INTRAVENOUS at 10:08

## 2021-06-07 RX ADMIN — PROPOFOL 300 MG: 10 INJECTION, EMULSION INTRAVENOUS at 10:36

## 2021-06-07 RX ADMIN — ROCURONIUM BROMIDE 10 MG: 10 INJECTION, SOLUTION INTRAVENOUS at 10:36

## 2021-06-07 RX ADMIN — ROCURONIUM BROMIDE 20 MG: 10 INJECTION, SOLUTION INTRAVENOUS at 10:59

## 2021-06-07 RX ADMIN — ROPIVACAINE HYDROCHLORIDE 30 ML: 5 INJECTION, SOLUTION EPIDURAL; INFILTRATION; PERINEURAL at 09:32

## 2021-06-07 RX ADMIN — SODIUM CHLORIDE, SODIUM LACTATE, POTASSIUM CHLORIDE, AND CALCIUM CHLORIDE 125 ML/HR: .6; .31; .03; .02 INJECTION, SOLUTION INTRAVENOUS at 09:21

## 2021-06-07 RX ADMIN — CEFAZOLIN 2000 MG: 1 INJECTION, POWDER, FOR SOLUTION INTRAMUSCULAR; INTRAVENOUS at 10:47

## 2021-06-07 RX ADMIN — ROPIVACAINE HYDROCHLORIDE 20 ML: 2 INJECTION, SOLUTION EPIDURAL; INFILTRATION at 09:41

## 2021-06-07 RX ADMIN — FENTANYL CITRATE 50 MCG: 50 INJECTION, SOLUTION INTRAMUSCULAR; INTRAVENOUS at 09:26

## 2021-06-07 RX ADMIN — Medication 100 MG: at 10:36

## 2021-06-07 RX ADMIN — MIDAZOLAM 1 MG: 1 INJECTION INTRAMUSCULAR; INTRAVENOUS at 09:26

## 2021-06-07 RX ADMIN — GLYCOPYRROLATE 0.2 MG: 0.2 INJECTION, SOLUTION INTRAMUSCULAR; INTRAVENOUS at 10:33

## 2021-06-07 RX ADMIN — GLYCOPYRROLATE 0.4 MG: 0.2 INJECTION, SOLUTION INTRAMUSCULAR; INTRAVENOUS at 11:48

## 2021-06-07 RX ADMIN — MIDAZOLAM 1 MG: 1 INJECTION INTRAMUSCULAR; INTRAVENOUS at 10:26

## 2021-06-07 RX ADMIN — PROPOFOL 100 MG: 10 INJECTION, EMULSION INTRAVENOUS at 10:39

## 2021-06-07 RX ADMIN — DEXAMETHASONE SODIUM PHOSPHATE 4 MG: 4 INJECTION, SOLUTION INTRA-ARTICULAR; INTRALESIONAL; INTRAMUSCULAR; INTRAVENOUS; SOFT TISSUE at 11:32

## 2021-06-07 RX ADMIN — FENTANYL CITRATE 50 MCG: 50 INJECTION, SOLUTION INTRAMUSCULAR; INTRAVENOUS at 11:56

## 2021-06-07 RX ADMIN — FENTANYL CITRATE 50 MCG: 50 INJECTION, SOLUTION INTRAMUSCULAR; INTRAVENOUS at 10:08

## 2021-06-07 NOTE — ANESTHESIA PROCEDURE NOTES
Peripheral Block    Patient location during procedure: holding area  Start time: 6/7/2021 9:26 AM  Reason for block: at surgeon's request and post-op pain management  Staffing  Anesthesiologist: Samantha Bain MD  Performed: anesthesiologist   Preanesthetic Checklist  Completed: patient identified, site marked, surgical consent, pre-op evaluation, timeout performed, IV checked, risks and benefits discussed and monitors and equipment checked  Peripheral Block  Prep: ChloraPrep  Patient monitoring: heart rate and continuous pulse ox  Block type: popliteal  Laterality: right  Injection technique: single-shot  Procedures: ultrasound guided, Ultrasound guidance required for the procedure to increase accuracy and safety of medication placement and decrease risk of complications  Ultrasound permanent image saved  Needle  Needle type: Stimuplex   Needle gauge: 20G    Needle localization: ultrasound guidance  Needle insertion depth: 3 cm  Assessment  Injection assessment: incremental injection, local visualized surrounding nerve on ultrasound, negative aspiration for heme and no paresthesia on injection  Paresthesia pain: none  Heart rate change: no  Slow fractionated injection: yes  Post-procedure:  site cleaned  patient tolerated the procedure well with no immediate complications

## 2021-06-07 NOTE — ANESTHESIA PROCEDURE NOTES
Peripheral Block    Patient location during procedure: holding area  Start time: 6/7/2021 9:39 AM  Reason for block: at surgeon's request and post-op pain management  Staffing  Anesthesiologist: Moe Mortensen MD  Performed: anesthesiologist   Preanesthetic Checklist  Completed: patient identified, site marked, surgical consent, pre-op evaluation, timeout performed, IV checked, risks and benefits discussed and monitors and equipment checked  Peripheral Block  Patient position: supine (R THIGH ADDUCTED)  Prep: ChloraPrep  Patient monitoring: heart rate and continuous pulse ox  Block type: adductor canal block  Laterality: right  Injection technique: single-shot  Procedures: ultrasound guided, Ultrasound guidance required for the procedure to increase accuracy and safety of medication placement and decrease risk of complications  Ultrasound permanent image saved  Needle  Needle type: Stimuplex   Needle gauge: 20G    Needle length: 10 cm  Needle localization: ultrasound guidance  Needle insertion depth: 4 cm  Assessment  Injection assessment: incremental injection, local visualized surrounding nerve on ultrasound, negative aspiration for heme and no paresthesia on injection  Paresthesia pain: none  Heart rate change: no  Slow fractionated injection: yes  Post-procedure:  site cleaned  patient tolerated the procedure well with no immediate complications

## 2021-06-07 NOTE — DISCHARGE INSTRUCTIONS
VAHE Morgan  Attending, [address] [place] [address] Office Phone: [phone] ? Fax: [phone]  [facility] Office Phone: [phone] ? NQA:[phone]    : Sridevi Stout) [place], [place]     Surgery Coordinators [place]: Vamsi Parisi, [address] [facility], [phone]  Surgery Coordinator [place]:  Jaswant Barajas, [phone]                                                             Danny Jin, [phone]  www [email or web address]   PRE-OPERATIVE AND POST-OPERATIVE INSTRUCTIONS    General Information:   Typical post operative visits are at the following intervals:  2-3 weeks post surgery, 6 weeks post surgery, 3 months post surgery, 6 months post surgery, and then on a yearly basis  However, this may change based on Dr Maryan Costa recommendation   #1 post-operative rule for foot/ankle surgery:  ONCE YOU ARE OUT OF YOUR CAST AND/OR REMOVABLE BOOT, SWELLING MAY PERSIST FOR MANY MONTHS  YOU MIGHT ALSO EXPERIENCE A BLUISH DISCOLORATION OF YOUR LEG  THIS IS NORMAL AND PART OF THE USUAL POSTOPERATIVE EXPERIENCE    DO NOT WAIT UNTIL YOUR BLOCK WEARS OFF TO TAKE YOUR PAIN MEDICATION  IT TAKES A FEW DOSES OF THE PAIN MEDICATION TO REACH A THERAPEUTIC LEVEL  TAKE A TABLET PROACTIVELY BEFORE YOU HAVE ANY PAIN AND AGAIN 4 HOURS LATER SO WHEN THE BLOCK WEARS OFF, YOU ARE NOT CAUGHT OFF GUARD  SMOKING:   Smoking results in incomplete healing of fractures (broken bones) and joints that my have been fused  Smoking and nicotine also prevents the growth of bone into ankle replacements and bone healing  It also slows the healing of muscles and skin (soft tissue)  Therefore, please do not have surgery if you continue to smoke  We reserve the right to cancel your surgery if we suspect that you are smoking  DO NOT use nicorette gum or other patches    Please find an alternative method to quit smoking before your surgery and do not restart after surgery to allow for healing  THREE RULES:    1  After surgery you will most likely be given the instructions KEEP YOUR TOES ABOVE YOUR NOSE    This means that you MUST have your feet elevated higher than your heart  Keeping your toes above your nose helps to heal the muscles and skin (soft tissues) by reducing swelling in your leg  This position also helps to prevent infection, and is very important in avoiding deep venous thrombosis (blood clots)  2  In order to keep the blood circulating in your legs and in order to avoid deep vein   thrombosis (blood clots), we ask patients to GET UP ONCE AN HOUR during the day  This means you should at least cross the room and come back  It does not mean you have to be up for long periods of time  In most cases we will not have people immediately put any weight on their operated part  This is important to prevent loosening of metal or other devices holding the bones together  It also prevents irritation of the soft tissues which can lead to prolonged healing  When we say get up once an hour, please walk, hop or move with an assisted device  This is important! 3  Do not do any excessive walking during the first few days after surgery  Recovering from surgery is a full-time task for the patient  Postoperative care is important to avoid irritating the skin incision, which can lead to infection  Please do not plan activities or go out of town for several weeks after surgery  AFTER YOUR SURGERY:   Bleeding through the bandage almost always occurs  Do not let this alarm you  Simply add more gauze or a towel, call us, and come in for a dressing change  If you think it is excessive, contact us immediately or go to the local emergency room   Do not get the bandage wet  Showering is possible with plastic protectors  Be very careful, as the bathroom can be wet and slippery    If you do get your dressing wet, it should be changed immediately  Please contact us   ONCE YOUR ARE OUT OF YOUR CAST AND/OR REMOVABLE BOOT, SWELLING MAY PERSIST FOR MANY MONTHS  THERE WILL ALSO BE A BLUISH DISCOLORATION OF YOUR LEG FOR MONTHS  THIS IS NORMAL AND PART OF THE USUAL POSTOPERATIVE EXPERIENCE  WEARING COMPRESSION HOSE (ELASTIC STOCKINGS) CAN HELP AVOID SOME OF THIS SWELLING   Ice the area 20 minutes every hour once the nerve block wears off  If you are in a cast or a splint, you may need to leave the ice on longer than 20 minutes in order to feel any benefits  DRESSING:   The purpose of the surgical dressing is to keep your wound and the surgical site protected from the environment  Most dressings contain splints, which help to hold your foot and ankle in a corrected position, and also allow the surgical site to heal properly  If you have a drain in place, this will need to be removed in 1 day after surgery  The time for the drain to be pulled will be written on your discharge instruction sheet  CAST  INSTRUCTIONS:  You may or may not get a cast following surgery  If you do, pay close attention to the following:     After application of a splint or cast, it is very important to elevate your leg for 24 to 72 hours  The injured area should be elevated well above the heart  Remember Toes above your Nose  Rest and elevation greatly reduce pain and speed the healing process by minimizing early swelling      CALL YOUR DOCTORS OFFICE OR VISIT LOCATION EMERGENCY ROOM IF YOU HAVE ANY OF THE FOLLOWING:     Significant increased pain, which may be caused by swelling (Strict elevation will alleviate this)   Numbness and tingling in your hand or foot, which may be caused by too much pressure on the nerves (There is always some numbness after surgery due to nerve blocks)   Burning and stinging, which may be caused by too much pressure on the skin   Excessive swelling below the cast, which may mean the cast is slowing your blood circulation   Loss of active movement of toes, which request an urgent evaluation   Loss of capillary refill  Pinch the tip of toes and carolyne the skin  Release pressure and if the skin does not return pink then call the office immediately  DO NOT GET YOUR CAST WET  Bacteria thrive in moist dark areas  We do not want this  If your cast becomes wet, return to the office and we will apply another one  PAIN AFTER SURGERY:  Narcotic pain medication can and will depress your respiratory system if taken in excess  The goal of pain management with narcotics is to be comfortable not pain free  If you take enough narcotics to be pain free then you run the risk of stopping breathing  If this happens, call 911 immediately!  Pain in the heel is often  caused by pressure from the weight of your foot on the bed  Make sure your heel is suspended off the bed by keeping a pillow underneath your calf not your knee  Medications: You will be given narcotic pain medication  Do NOT drive while taking narcotic medications  Medications such as Darvocet, Percocet, Vicoden or Tylenol #3, also contain acetaminophen (Tylenol)  Do not take acetaminophen or Tylenol from home when taking theses medications  When you fill your prescription, you may ask the pharmacist if your pain medication has acetaminophen/Tylenol in it  It is okay to take Tylenol with Oxycontin/Oxycodone  Unless you are allergic to aspirin or currently taking a blood thinner, Dr Fanny Vann patients are requested to take one 325 mg aspirin every 12 hours until you are back to walking normally after surgery (This can be up to 6 weeks)  Ecotrin (Enteric-coated aspirin) is more sensitive to the stomach and we recommend purchasing this instead of regular aspirin to minimize the risk of stomach irritation  Narcotic medications commonly cause nausea  Taking them with food will decrease this side effect   If you are having extreme nausea, please contact us for an alternative medication or for something that can be taken with this medication to decrease the nausea  Also, narcotic medications frequently cause constipation  An increase of fiber, fruits and vegetables in your diet may alleviate this problem, or if necessary, you may use an over-the-counter medication such as senekot, colace, or Fibercon for constipation problems  You should resume all medications you were taking prior to the surgery unless otherwise specified  If you had fracture surgery, bony surgery like an osteotomy or fusion, or a surgery that requires bone healing, you are advised to take Vitamin D and Calcium to improve healing potential   Vitamin D3 4000 units/day and Calcium 1200mg/day  These are over the counter medications so please pick them up at the pharmacy when you are picking up your prescriptions  Activity:   Because of your recent foot surgery, your activity level will decrease  You will need to elevate your foot ABOVE the level of your heart for a minimum of four days  The length of time necessary for the swelling to go down, and for your wounds to heal properly depends greatly on your efforts here  Elevation is extremely important to avoid compromising the blood supply to your foot  Remember when your foot is down it will swell, which will increase pain and slow healing  Wiggle your toes frequently if possible  If you go home with a regional block, (a type of anesthesia) the foot and leg will be numb  Think of ways to get into your house and around the house until the block wears off  Keep in mind that it may be a legal issue if you drive while in a cast or splint, especially when the splint is on the right foot  You may call the Department of Motor Vehicles to schedule a road test if you have adaptive equipment applied to your car     The amount of weight you are allowed to bear on your foot will be written on your discharge sheet filled out at the time of surgery  The following is an explanation of the possibilities:     Non-weight bearing: You are to put NO weight whatsoever on your foot  When using crutches or a walker, your foot should not touch the ground, except when you are standing  Then, it may rest on the ground  If you are to be non-weight bearing, and you are not compliant, you could compromise the surgery  Some of our patients have been requesting prescriptions for a roll-a-bout knee scooter  [employer] and other insurances have been denying these claims, and you may either have to rent one or pay out of pocket to purchase one

## 2021-06-07 NOTE — OP NOTE
OPERATIVE REPORT  PATIENT NAME: George Sutton    :  1990  MRN: [id number]  Pt Location:  OR ROOM 03    SURGERY DATE: 2021    Surgeon(s) and Role:     Jacqueline Griggs MD - Primary     * Jess David PA-C - Assisting    Preop Diagnosis:  Closed trimalleolar fracture of right ankle, initial encounter [V64 796Z]    Post-Op Diagnosis Codes:     * Closed trimalleolar fracture of right ankle, initial encounter [E83 218L]    Procedure(s) (LRB):  OPEN REDUCTION W/ INTERNAL FIXATION (ORIF) ANKLE (Right)    Specimen(s):  * No specimens in log *    Estimated Blood Loss:   Minimal    Drains:  * No LDAs found *    Anesthesia Type:   Choice    Operative Indications:  Closed trimalleolar fracture of right ankle, initial encounter [J52 253I]      Operative Findings:  Fibula stable and nondisplaced, infrasyndesmotic    Complications:   None    Procedure and Technique:  1  Open reduction and internal fixation of tibial plafond fracture (Volkmann and postero medial tibia fragments)  2  Closed reduction and casting of lateral malleolus fracture   3  Intraoperative fluoroscopic interpretation, greater than one hour, no radiologist   4  Placement into a short leg, nonweightbearing, plaster ankle splint      OPERATIVE REPORT:    After informed consent and preoperative medical clearance were obtained, the patient was taken to the preoperative holding area  Allergies were properly assessed and patient was given appropriate perioperative IV antibiotics without complication  Please see the anesthesia report for details of the anesthesia administered  Patient was taken the operating room placed prone on the operating room table  All bony prominences and skin were well padded, airway maintained, genitalia protected and brachial plexi/ulnar nerves at the elbows protected  Patient's operative lower extremity was prepped and draped in sterile fashion       The operative lower extremity was exsanguinated with esmarch elastic bandage and a thigh tourniquet was utilized  A time-out was performed with the attending surgeon in the room  A longitudinal incision was made posteriorly over the postero lateral ankle, between peroneal tendons and Achilles  Careful soft tissue dissection was performed  The sural nerve branch was protected throughout the procedure  FHL tendon protected; deep NVB protected  Peroneal tendons protected  With minimal periosteal stripping, the fibular fracture fracture and the posterior malleolar fracture were exposed  The hematoma was evacuated  The fractures were reduced into an anatomic position  With the Neurovascular structures protected, posterior malleolus fracture reduced and then secured with a posterior plate placed under fluoro guidance  Screws did not violate joint space  We then made a posteromedial longitudinal incision  Using the visualization window from the posterolateral approach, we reduced and pinned the posteromedial fracture fragment  We then drove a 4-0 cannulated screw to fixate the fracture and confirmed an anatomic reduction on Xray  Dorsiflexion and external rotation stress testing demonstrated no medial clear space widening and and no instability to the syndesmosis  There was also no instability of the fibula or displacement of the fracture through stress imaging and so the decision was made to leave this fracture without fixating it since it was now reduced into anatomic position  Thorough irrigation was performed using copious amounts of sterile saline  Vancomycin powder was used in both wound beds  The tourniquet was released and meticulous hemostasis was obtained  The wound was closed in layers with Vicryl suture for the deeper layers and nylon suture for the skin for a tension-less closure  There was no blanching at the skin margins after closure   Sterile dressings were applied with the ankle in neutral position and over-abundant padding with a posterior/sugar tong splint was applied  Satisfactory capillary refill remained in all toes  Patient tolerated the procedure well  There were no complications  He was taken to the recovery room in stable condition  DISPOSITION:   1  Patient is stable to PACU  2  Non-weightbearing to lower extremity for 6 weeks  3  Pain control  4  DVT prophylaxis with ASA 325mg BID  5  Follow-up at 3 weeks with suture removal if appropriate and short leg cast at neutral position and advance to touchdown-weightbearing at that time       I was present for the entire procedure, A qualified resident physician was not available and A physician assistant was required during the procedure for retraction tissue handling,dissection and suturing    Patient Disposition:  PACU     SIGNATURE: Patricia Galicia MD  DATE: June 7, 2021  TIME: 12:34 PM

## 2021-06-07 NOTE — INTERVAL H&P NOTE
H&P reviewed  After examining the patient I find no changes in the patients condition since the H&P had been written  Vitals:    06/07/21 0906   BP: 124/78   Pulse: 60   Resp: 18   Temp: 98 4 °F (36 9 °C)   SpO2: 97%     Plan for ORIF right ankle

## 2021-06-14 ENCOUNTER — TELEPHONE (OUTPATIENT)
Dept: OBGYN CLINIC | Facility: HOSPITAL | Age: 31
End: 2021-06-14

## 2021-06-14 NOTE — TELEPHONE ENCOUNTER
Patient called to advise he is a lot of pain  He would like to know if he can get a refill on his pain medication  If we can give him a call when request is received at [phone]   Thank you

## 2021-06-15 DIAGNOSIS — S82.871A TRAUMATIC CLOSED DISPLACED FRACTURE OF TIBIAL PLAFOND, RIGHT, INITIAL ENCOUNTER: Primary | ICD-10-CM

## 2021-06-15 RX ORDER — OXYCODONE HYDROCHLORIDE 5 MG/1
5 TABLET ORAL EVERY 4 HOURS PRN
Qty: 20 TABLET | Refills: 0 | Status: SHIPPED | OUTPATIENT
Start: 2021-06-15 | End: 2021-06-20

## 2021-07-06 ENCOUNTER — OFFICE VISIT (OUTPATIENT)
Dept: OBGYN CLINIC | Facility: CLINIC | Age: 31
End: 2021-07-06

## 2021-07-06 ENCOUNTER — TELEPHONE (OUTPATIENT)
Dept: OBGYN CLINIC | Facility: CLINIC | Age: 31
End: 2021-07-06

## 2021-07-06 VITALS
HEART RATE: 105 BPM | HEIGHT: 70 IN | DIASTOLIC BLOOD PRESSURE: 73 MMHG | WEIGHT: 231 LBS | BODY MASS INDEX: 33.07 KG/M2 | RESPIRATION RATE: 20 BRPM | SYSTOLIC BLOOD PRESSURE: 112 MMHG

## 2021-07-06 DIAGNOSIS — S82.871A TRAUMATIC CLOSED DISPLACED FRACTURE OF TIBIAL PLAFOND, RIGHT, INITIAL ENCOUNTER: Primary | ICD-10-CM

## 2021-07-06 PROCEDURE — 99024 POSTOP FOLLOW-UP VISIT: CPT | Performed by: ORTHOPAEDIC SURGERY

## 2021-07-06 NOTE — PATIENT INSTRUCTIONS
Continue aspirin/lovenox for blood clot prevention  May shower, do not soak in a tub/pool/ocean/etc for another 4 weeks  Begin PT  Scar massage- pea sized amount of lotion, massage into scar for 5 minutes each day  Compression stocking (Knee high, 20-30mm Hg) to be worn at all times while awake  Recommend taking the following supplements: Vitamin D 4000 units per day and Calcium 1200 mg per day  This will help with bone healing  Wear the boot at all times except when showering and in PT, even to sleep at night

## 2021-07-06 NOTE — TELEPHONE ENCOUNTER
Haroldo Fontana,     We do not send refills of narcotic pain medication after 3 weeks post-op  He will need to transition to tylenol at this time  The aspirin was prescribed as 84 tablets to be taken twice daily for 6 weeks  I will send a refill but he shouldn't need one       Thanks,   Sean Alcazar

## 2021-07-06 NOTE — TELEPHONE ENCOUNTER
Pt at checkout, forgot to mention that he needs a refill:    Oxycodone 5mg immediate release   1 tablet PO Q4H PRN     Asprin 325mg   1 tablet PO BID     Pt uses [facility] pharmacy in [facility]

## 2021-07-06 NOTE — PROGRESS NOTES
VAHE Stewart  Attending, Orthopaedic Surgery  Foot and Ankle  [facility]      ORTHOPAEDIC FOOT AND ANKLE POST-OP VISIT     Procedure:     L ankle ORIF       Date of surgery:   6/7/21      PLAN  1  Weightbearing Status- NWB operative extremity  2  DVT prophylaxis-  BID  3  Continue PT  4  Pain control- OTC pain medication  5  RTC in 3 week(s)  6  Xrays needed next visit - yes Weightbearing Ankle    History of Present Illness:   Chief Complaint:   Chief Complaint   Patient presents with    Right Ankle - Post-op, Pain     Alonso Butler is a 27 y o  male who is being seen for post-operative visit for the above procedure  Pain is well controlled and the patient has successfully transitioned to OTC pain medicines  he is taking ASA 325mg BID for DVT prophylaxis  Patient has been NWB in a Splint  Review of Systems:  General- denies fever/chills  Respiratory- denies cough or SOB  Cardio- denies chest pain or palpitations  GI- denies abdominal pain  Musculoskeletal- Negative except noted above  Skin- denies rashes or wounds    Physical Exam:   /73 (BP Location: Right arm, Patient Position: Sitting)   Pulse 105   Resp 20   Ht 5' 10" (1 778 m)   Wt 105 kg (231 lb)   BMI 33 15 kg/m²   General/Constitutional: No apparent distress: well-nourished and well developed  Eyes: normal ocular motion  Lymphatic: No appreciable lymphadenopathy  Respiratory: Non-labored breathing  Vascular: No edema, swelling or tenderness, except as noted in detailed exam   Integumentary: No impressive skin lesions present, except as noted in detailed exam   Neuro: No ataxia or tremors noted  Psych: Normal mood and affect, oriented to person, place and time  Appropriate affect  Musculoskeletal: Normal, except as noted in detailed exam and in HPI      Examination    right        Incision Clean, dry, intact  Sutures Removed this visit    Ecchymosis none    Swelling Mild    Sensation Intact to light touch throughout sural, saphenous, superficial peroneal, deep peroneal and medial/lateral plantar nerve distributions  Kaplan-Camelia 5 07 filament (10g) testing deferred  Cardiovascular Brisk capillary refill < 2 seconds,intact DP and PT pulses    Special Tests None      Imaging Studies:   No new imaging obtained today        Malva Pay Lachman, MD  Foot & Ankle Surgery   Department of [address] [place] [address]      I personally performed the service  Malva Pay Lachman, MD

## 2021-07-09 ENCOUNTER — TELEPHONE (OUTPATIENT)
Dept: OBGYN CLINIC | Facility: CLINIC | Age: 31
End: 2021-07-09

## 2021-07-23 ENCOUNTER — OFFICE VISIT (OUTPATIENT)
Dept: OBGYN CLINIC | Facility: CLINIC | Age: 31
End: 2021-07-23

## 2021-07-23 ENCOUNTER — APPOINTMENT (OUTPATIENT)
Dept: RADIOLOGY | Facility: AMBULARY SURGERY CENTER | Age: 31
End: 2021-07-23
Attending: ORTHOPAEDIC SURGERY

## 2021-07-23 VITALS
DIASTOLIC BLOOD PRESSURE: 79 MMHG | BODY MASS INDEX: 33.07 KG/M2 | SYSTOLIC BLOOD PRESSURE: 125 MMHG | HEIGHT: 70 IN | WEIGHT: 231 LBS | RESPIRATION RATE: 19 BRPM | HEART RATE: 118 BPM

## 2021-07-23 DIAGNOSIS — M25.571 PAIN, JOINT, ANKLE AND FOOT, RIGHT: ICD-10-CM

## 2021-07-23 DIAGNOSIS — S82.871A TRAUMATIC CLOSED DISPLACED FRACTURE OF TIBIAL PLAFOND, RIGHT, INITIAL ENCOUNTER: Primary | ICD-10-CM

## 2021-07-23 PROCEDURE — 73610 X-RAY EXAM OF ANKLE: CPT

## 2021-07-23 PROCEDURE — 99024 POSTOP FOLLOW-UP VISIT: CPT | Performed by: ORTHOPAEDIC SURGERY

## 2021-07-23 NOTE — PATIENT INSTRUCTIONS
4 days of partial weight bearing 50%  Then, 4 days of partial weight bearing 75%  Then, 4 days of partial weight bearing 90%  Then full weight bearing in the boot and wean your crutches/rolling walker  Then 2 weeks of weightbearing as tolerated in the CAM boot  You may begin weaning your boot and transitioning to a sneaker (8/20/21)  It is important to do this gradually to avoid aggravating the healing process  1  8/20, you may come out of the boot into a sneaker for 2 hours  2  8/21, you may come out of the boot into a sneaker for 4 hours,  3  The next day, you may come out of the boot into a sneaker for 6 hours  4  Continue this (adding 2 hours per day) as you tolerate  For example, if you do 6 hours out of the boot into a sneaker and your foot swells more than usual at night and it is difficult to control the discomfort, do not advance to 8 hours the next day, stay at 6 hours until you are able to tolerate it  Elevation, Ice and tylenol and staying off of it at night will be important to aide in this transition out of the boot  Swelling and soreness are normal as you begin to do more with the injured leg  May DC aspirin/lovenox, no longer needed  May shower, do not soak in a tub/pool/ocean/etc for another 1 weeks  Continue PT  Scar massage- pea sized amount of lotion, massage into scar for 5 minutes each day  Compression stocking (Knee high, 20-30mm Hg) to be worn at all times while awake  Recommend taking the following supplements: Vitamin D3 4000 units per day and Calcium 1200 mg per day  This will help with bone healing

## 2021-07-23 NOTE — PROGRESS NOTES
VAHE Ponce  Attending, Orthopaedic Surgery  Foot and Ankle  [facility]      ORTHOPAEDIC FOOT AND ANKLE POST-OP VISIT     Procedure:     Right posterior malleolus/tibial plafond fracture ORIF       Date of surgery:   6/7/21      PLAN  1  Weightbearing Status- PWB operative extremity  2  DVT prophylaxis- completed  3  Continue PT  4  Pain control- OTC pain medication  5  RTC in 6 week(s)  6  Xrays needed next visit - yes Weightbearing Ankle    History of Present Illness:   Chief Complaint:   Chief Complaint   Patient presents with    Right Ankle - Pain, Follow-up     Kaylee Liu is a 27 y o  male who is being seen for post-operative visit for the above procedure  Pain is well controlled and the patient has successfully transitioned to OTC pain medicines  he is taking ASA 325mg BID for DVT prophylaxis  Patient has been NWB in a CAM boot  Review of Systems:  General- denies fever/chills  Respiratory- denies cough or SOB  Cardio- denies chest pain or palpitations  GI- denies abdominal pain  Musculoskeletal- Negative except noted above  Skin- denies rashes or wounds    Physical Exam:   /79 (BP Location: Right arm, Patient Position: Sitting)   Pulse (!) 118   Resp 19   Ht 5' 10" (1 778 m)   Wt 105 kg (231 lb)   BMI 33 15 kg/m²   General/Constitutional: No apparent distress: well-nourished and well developed  Eyes: normal ocular motion  Lymphatic: No appreciable lymphadenopathy  Respiratory: Non-labored breathing  Vascular: No edema, swelling or tenderness, except as noted in detailed exam   Integumentary: No impressive skin lesions present, except as noted in detailed exam   Neuro: No ataxia or tremors noted  Psych: Normal mood and affect, oriented to person, place and time  Appropriate affect  Musculoskeletal: Normal, except as noted in detailed exam and in HPI  Examination    right        Incision Clean, dry, intact  Sutures Previously removed  Ecchymosis none    Swelling mild    Sensation Intact to light touch throughout sural, saphenous, superficial peroneal, deep peroneal and medial/lateral plantar nerve distributions  Dayton-Camelia 5 07 filament (10g) testing deferred  Cardiovascular Brisk capillary refill < 2 seconds,intact DP and PT pulses    Special Tests None      Imaging Studies:   3 views of the right ankle were taken, reviewed and interpreted independently that demonstrate hardware is in the expected position without loosening or hardware failure  Interval healing noted at the fracture site  Reviewed by me personally  Scribe Attestation    I,:  Prateek Lay PA-C am acting as a scribe while in the presence of the attending physician :       I,:  Sanju Amezcua MD personally performed the services described in this documentation    as scribed in my presence :             Ava Hitch Lachman, MD  Foot & Ankle Surgery   Department [address]      I personally performed the service  Ava Hitch Lachman, MD

## 2021-07-28 ENCOUNTER — EVALUATION (OUTPATIENT)
Dept: PHYSICAL THERAPY | Facility: CLINIC | Age: 31
End: 2021-07-28

## 2021-07-28 DIAGNOSIS — S82.871A TRAUMATIC CLOSED DISPLACED FRACTURE OF TIBIAL PLAFOND, RIGHT, INITIAL ENCOUNTER: ICD-10-CM

## 2021-07-28 PROCEDURE — 97161 PT EVAL LOW COMPLEX 20 MIN: CPT | Performed by: PHYSICAL THERAPIST
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
## 2021-07-30 ENCOUNTER — OFFICE VISIT (OUTPATIENT)
Dept: PHYSICAL THERAPY | Facility: CLINIC | Age: 31
End: 2021-07-30

## 2021-07-30 DIAGNOSIS — S82.871A TRAUMATIC CLOSED DISPLACED FRACTURE OF TIBIAL PLAFOND, RIGHT, INITIAL ENCOUNTER: Primary | ICD-10-CM

## 2021-07-30 PROCEDURE — 97140 MANUAL THERAPY 1/> REGIONS: CPT | Performed by: PHYSICAL THERAPIST

## 2021-07-30 PROCEDURE — 97110 THERAPEUTIC EXERCISES: CPT | Performed by: PHYSICAL THERAPIST

## 2021-07-30 NOTE — PROGRESS NOTES
Daily Note     Today's date: 2021  Patient name: Prudence Nuñez  : 1990  MRN: [id number]  Referring provider: Sylvia Muro  Dx:   Encounter Diagnosis     ICD-10-CM    1  Traumatic closed displaced fracture of tibial plafond, right, initial encounter  [id number]                   Subjective: Pt reports that he has had trouble getting his ankle to move much at all yet      Objective: See treatment diary below      Assessment: Tolerated treatment well  Patient is displaying significant R ankle stiffness in all planes of motion, with eversion/inversion the most affected  He displays minimal eversion/inversion AROM, and requires stabilizing at the ankle to prevent hip ER/R compensation  He was able to make some improvements with active DF/PF following manual therapy  He would benefit from continued skilled PT  Plan: Continue per plan of care  Progress treatment as tolerated  Precautions: 4 days of partial weight bearing 50%  Then, 4 days of partial weight bearing 75%  Then, 4 days of partial weight bearing 90%  Then full weight bearing in the boot and wean your crutches/rolling walker      Then 2 weeks of weightbearing as tolerated in the CAM boot      You may begin weaning your boot and transitioning to a sneaker (21)  It is important to do this gradually to avoid aggravating the healing process      1  , you may come out of the boot into a sneaker for 2 hours  2  , you may come out of the boot into a sneaker for 4 hours,  3  The next day, you may come out of the boot into a sneaker for 6 hours  4  Continue this (adding 2 hours per day) as you tolerate  For example, if you do 6 hours out of the boot into a sneaker and your foot swells more than usual at night and it is difficult to control the discomfort, do not advance to 8 hours the next day, stay at 6 hours until you are able to tolerate it      HEP: Ankle Pumps, Ankien inversion, ankle ABC, ankle Circles, Seated Gastroc stretch with towel  Manuals 7/30            R Ankle PROM 30'                                                   Neuro Re-Ed                                                                                                        Ther Ex             Stationary Bike L1 7'            Ankle inversion/eversion AROM 2 x20            Ankle DF/PFAROM 2 x20            Ankle circles CW/CCW 2 x20            Seated Towel Curls             Seated Foot Yoga             Ankle Isometrics: 4-way into ball                          LAQ             SLR             S/L hip ABD             Ther Activity                                       Gait Training                                       Modalities

## 2021-08-04 ENCOUNTER — OFFICE VISIT (OUTPATIENT)
Dept: PHYSICAL THERAPY | Facility: CLINIC | Age: 31
End: 2021-08-04

## 2021-08-04 DIAGNOSIS — S82.871A TRAUMATIC CLOSED DISPLACED FRACTURE OF TIBIAL PLAFOND, RIGHT, INITIAL ENCOUNTER: Primary | ICD-10-CM

## 2021-08-04 PROCEDURE — 97140 MANUAL THERAPY 1/> REGIONS: CPT | Performed by: PHYSICAL THERAPIST

## 2021-08-04 PROCEDURE — 97110 THERAPEUTIC EXERCISES: CPT | Performed by: PHYSICAL THERAPIST

## 2021-08-04 NOTE — PROGRESS NOTES
Daily Note     Today's date: 2021  Patient name: Reinaldo Frey  : 1990  MRN: [id number]  Referring provider: Marguerite Peck  Dx:   Encounter Diagnosis     ICD-10-CM    1  Traumatic closed displaced fracture of tibial plafond, right, initial encounter  F15 295L                   Subjective: Pt reports that he has been slowly starting to put more yola through the R LE while in the CAM boot  Objective: See treatment diary below      Assessment: Tolerated treatment well  Patient is displaying mild but steady progress with R ankle PROM in all planes of motion  He continues to have significant limitations into eversion, actively, displaying approximately 1/5 strength today  He is also displaying significant R hip weakness that is likely secondary to being NWB for so many weeks now  He had significant muscle fatigue with SLR, S/L hip ABD, and prone hip extension  He was given these exercises to add to his HEP to progress strength on days he is not in therapy  He is allowed to start being FWB in the CAM boot tomorrow, and would benefit from skilled PT to help transition to normalizing gait, improving strength, and returning normal motion  Plan: Gait training with FWB in CAM boot, LE strengthening, PROM     Precautions: 4 days of partial weight bearing 50%  Then, 4 days of partial weight bearing 75%  Then, 4 days of partial weight bearing 90%  Then full weight bearing in the boot and wean your crutches/rolling walker      Then 2 weeks of weightbearing as tolerated in the CAM boot      You may begin weaning your boot and transitioning to a sneaker (21)  It is important to do this gradually to avoid aggravating the healing process      1  , you may come out of the boot into a sneaker for 2 hours  2  , you may come out of the boot into a sneaker for 4 hours,  3  The next day, you may come out of the boot into a sneaker for 6 hours    4  Continue this (adding 2 hours per day) as you tolerate  For example, if you do 6 hours out of the boot into a sneaker and your foot swells more than usual at night and it is difficult to control the discomfort, do not advance to 8 hours the next day, stay at 6 hours until you are able to tolerate it      HEP: Ankle Pumps, Ankien inversion, ankle ABC, ankle Circles, Seated Gastroc stretch with towel  HEP(Updated 8/4): SLR, LAQ, prone hip extension, S/L hip ABD  Manuals 7/30 8/4           R Ankle PROM 30' 23'                                                  Neuro Re-Ed                                                                                                        Ther Ex             Stationary Bike L1 7' L1 7'           Ankle inversion/eversion AROM 2 x20 2 x20           Ankle DF/PFAROM 2 x20 2 x20           Ankle circles CW/CCW 2 x20 2 x20           Seated Towel Curls             Seated Foot Yoga             Ankle Isometrics: 4-way into ball                          LAQ  2#  x20           SLR  2#  2 x10           S/L hip ABD   2 x10           Prone hamstring curls  2 x10           Prone straight leg raise  2 x10           Ther Activity                                       Gait Training                                       Modalities

## 2021-08-06 ENCOUNTER — OFFICE VISIT (OUTPATIENT)
Dept: PHYSICAL THERAPY | Facility: CLINIC | Age: 31
End: 2021-08-06

## 2021-08-06 DIAGNOSIS — S82.871A TRAUMATIC CLOSED DISPLACED FRACTURE OF TIBIAL PLAFOND, RIGHT, INITIAL ENCOUNTER: Primary | ICD-10-CM

## 2021-08-06 PROCEDURE — 97140 MANUAL THERAPY 1/> REGIONS: CPT | Performed by: PHYSICAL THERAPIST

## 2021-08-06 PROCEDURE — 97110 THERAPEUTIC EXERCISES: CPT | Performed by: PHYSICAL THERAPIST

## 2021-08-06 NOTE — PROGRESS NOTES
Daily Note     Today's date: 2021  Patient name: Reinaldo Frey  : 1990  MRN: [id number]  Referring provider: Marguerite Peck  Dx:   Encounter Diagnosis     ICD-10-CM    1  Traumatic closed displaced fracture of tibial plafond, right, initial encounter  Y90 378F                   Subjective: Pt says that he is having a bad day pain wise, he woke up with throbbing in his foot  Objective: See treatment diary below      Assessment: Tolerated treatment welli  Patient continues to respond well to manual therapy, with reports of improved comfort afterwards  Mild improvements made with PROM in all R ankle planes of motion  He continues to display significant ROM and strength deficits into eversion and inversion, so isometrics were introduced today, he tolerated them well  He also continues to be moderately challenged with SLR in supine, ABD, prone, suggestive of hip and knee weakness, likely secondary to deconditioning with NWB  He would benefit from continued skilled PT  Plan: Continue per plan of care  Progress treatment as tolerated  Precautions: 4 days of partial weight bearing 50%  Then, 4 days of partial weight bearing 75%  Then, 4 days of partial weight bearing 90%  Then full weight bearing in the boot and wean your crutches/rolling walker      Then 2 weeks of weightbearing as tolerated in the CAM boot      You may begin weaning your boot and transitioning to a sneaker (21)  It is important to do this gradually to avoid aggravating the healing process      1  , you may come out of the boot into a sneaker for 2 hours  2  , you may come out of the boot into a sneaker for 4 hours,  3  The next day, you may come out of the boot into a sneaker for 6 hours  4  Continue this (adding 2 hours per day) as you tolerate   For example, if you do 6 hours out of the boot into a sneaker and your foot swells more than usual at night and it is difficult to control the discomfort, do not advance to 8 hours the next day, stay at 6 hours until you are able to tolerate it      HEP: Ankle Pumps, Ankien inversion, ankle ABC, ankle Circles, Seated Gastroc stretch with towel  HEP(Updated 8/4): SLR, LAQ, prone hip extension, S/L hip ABD  Manuals 7/30 8/4 8/6          R Ankle PROM 30' 23' 20'                                                 Neuro Re-Ed                                                                                                        Ther Ex             Stationary Bike L1 7' L1 7' L1 7'          Ankle inversion/eversion AROM 2 x20 2 x20 3 x20          Ankle DF/PFAROM 2 x20 2 x20 3 x20          Ankle circles CW/CCW 2 x20 2 x20 3 x20          Seated Towel Curls             Seated Foot Yoga             Ankle Isometrics: 3-way into ball   Inv/ev/PF  10 x10" each                       LAQ  2#  x20 2#  x20          SLR  2#  2 x10 2#  x20          S/L hip ABD   2 x10 x20          Prone hamstring curls  2 x10 2 x10          Prone straight leg raise  2 x10 2 x10          Ther Activity                                       Gait Training                                       Modalities

## 2021-08-11 ENCOUNTER — OFFICE VISIT (OUTPATIENT)
Dept: PHYSICAL THERAPY | Facility: CLINIC | Age: 31
End: 2021-08-11

## 2021-08-11 DIAGNOSIS — S82.871A TRAUMATIC CLOSED DISPLACED FRACTURE OF TIBIAL PLAFOND, RIGHT, INITIAL ENCOUNTER: Primary | ICD-10-CM

## 2021-08-11 PROCEDURE — 97140 MANUAL THERAPY 1/> REGIONS: CPT | Performed by: PHYSICAL THERAPIST

## 2021-08-11 PROCEDURE — 97110 THERAPEUTIC EXERCISES: CPT | Performed by: PHYSICAL THERAPIST

## 2021-08-11 NOTE — PROGRESS NOTES
Daily Note     Today's date: 2021  Patient name: Yola Proctor  : 1990  MRN: [id number]  Referring provider: Dorie Giang  Dx:   Encounter Diagnosis     ICD-10-CM    1  Traumatic closed displaced fracture of tibial plafond, right, initial encounter  C88 458L                   Subjective: Pt says he has been experiencing less pain with walking as the days have progressed  Objective: See treatment diary below      Assessment: Tolerated treatment well  Patient continues to display significant hypomobility and ROM deficits in all planes of motion of the R ankle  Ankle inversion and eversion are the most limited with AROM, PROM, and strength  He is making slow but steady progress with R gross LE strength, performing LAQ with more resistance, and prone hamstring curls with little fatigue  He would benefit from continued skilled PT to progress these deficits and progress per protocol  Plan: FWB training in boot     Precautions: 4 days of partial weight bearing 50%  Then, 4 days of partial weight bearing 75%  Then, 4 days of partial weight bearing 90%  Then full weight bearing in the boot and wean your crutches/rolling walker      Then 2 weeks of weightbearing as tolerated in the CAM boot      You may begin weaning your boot and transitioning to a sneaker (/)  It is important to do this gradually to avoid aggravating the healing process      1  , you may come out of the boot into a sneaker for 2 hours  2  , you may come out of the boot into a sneaker for 4 hours,  3  The next day, you may come out of the boot into a sneaker for 6 hours  4  Continue this (adding 2 hours per day) as you tolerate  For example, if you do 6 hours out of the boot into a sneaker and your foot swells more than usual at night and it is difficult to control the discomfort, do not advance to 8 hours the next day, stay at 6 hours until you are able to tolerate it      HEP: Ankle Sarah Miranda inversion, ankle ABC, ankle Circles, Seated Gastroc stretch with towel  HEP(Updated 8/4): SLR, LAQ, prone hip extension, S/L hip ABD  Manuals 7/30 8/4 8/6 8/11         R Ankle PROM 30' 23' 20' 23'                                                Neuro Re-Ed                                                                                                        Ther Ex             Stationary Bike L1 7' L1 7' L1 7' L1 7'         Ankle inversion/eversion AROM 2 x20 2 x20 3 x20 2 x20         Ankle DF/PFAROM 2 x20 2 x20 3 x20 2 x20         Ankle circles CW/CCW 2 x20 2 x20 3 x20 2 x20         Seated Towel Curls             Seated Foot Yoga             Ankle Isometrics: 3-way into ball   Inv/ev/PF  10 x10" each Inv/ev/PF  10 x10" each                      LAQ  2#  x20 2#  x20 2 5#  x20         SLR  2#  2 x10 2#  x20          S/L hip ABD   2 x10 x20          Prone hamstring curls  2 x10 2 x10 x20         Prone straight leg raise  2 x10 2 x10          Ther Activity                                       Gait Training                                       Modalities

## 2021-08-12 ENCOUNTER — TELEPHONE (OUTPATIENT)
Dept: OBGYN CLINIC | Facility: OTHER | Age: 31
End: 2021-08-12

## 2021-08-12 NOTE — TELEPHONE ENCOUNTER
Patient called in with a question  He is 100% putting weight on his foot, no crutches     -Can he go swimming yet?      C/b # [phone]    Thank you

## 2021-08-13 ENCOUNTER — OFFICE VISIT (OUTPATIENT)
Dept: PHYSICAL THERAPY | Facility: CLINIC | Age: 31
End: 2021-08-13

## 2021-08-13 DIAGNOSIS — S82.871A TRAUMATIC CLOSED DISPLACED FRACTURE OF TIBIAL PLAFOND, RIGHT, INITIAL ENCOUNTER: Primary | ICD-10-CM

## 2021-08-13 PROCEDURE — 97112 NEUROMUSCULAR REEDUCATION: CPT | Performed by: PHYSICAL THERAPIST

## 2021-08-13 PROCEDURE — 97110 THERAPEUTIC EXERCISES: CPT | Performed by: PHYSICAL THERAPIST

## 2021-08-13 PROCEDURE — 97140 MANUAL THERAPY 1/> REGIONS: CPT | Performed by: PHYSICAL THERAPIST

## 2021-08-13 NOTE — PROGRESS NOTES
Daily Note     Today's date: 2021  Patient name: Vane Goodman  : 1990  MRN: [id number]  Referring provider: Neo Montero  Dx:   Encounter Diagnosis     ICD-10-CM    1  Traumatic closed displaced fracture of tibial plafond, right, initial encounter  C45 857W                   Subjective: Pt says that he is now 100% FWB with CAM boot on, he does not use his crutches at all at home any more  Objective: See treatment diary below      Assessment: Tolerated treatment well  Patient presented to PT today carrying his axillary crutches, with an antalgic pattern, FWB through R LE  Moderately  R stance phase  He was able to make approximately 50% improvements with gait training, but is likely limited at this time from the height discrepancy between his shoe and his boot height  He would continue to benefit from PROM to improve ankle ROM in all planes of motion, as well as to decrease joint stiffness  Plan: Transition out of CAM boot per protocol, continue working on ankle ROM, LE strength     Precautions: 4 days of partial weight bearing 50%  Then, 4 days of partial weight bearing 75%  Then, 4 days of partial weight bearing 90%  Then full weight bearing in the boot and wean your crutches/rolling walker      Then 2 weeks of weightbearing as tolerated in the CAM boot      You may begin weaning your boot and transitioning to a sneaker (21)  It is important to do this gradually to avoid aggravating the healing process      1  , you may come out of the boot into a sneaker for 2 hours  2  , you may come out of the boot into a sneaker for 4 hours,  3  The next day, you may come out of the boot into a sneaker for 6 hours  4  Continue this (adding 2 hours per day) as you tolerate   For example, if you do 6 hours out of the boot into a sneaker and your foot swells more than usual at night and it is difficult to control the discomfort, do not advance to 8 hours the next day, stay at 6 hours until you are able to tolerate it      HEP: Ankle Pumps, Ankien inversion, ankle ABC, ankle Circles, Seated Gastroc stretch with towel  HEP(Updated 8/4): SLR, LAQ, prone hip extension, S/L hip ABD  Manuals 7/30 8/4 8/6 8/11 8/13        R Ankle PROM 30' 23' 20' 23' 13'                                               Neuro Re-Ed                                                                                                        Ther Ex             Stationary Bike L1 7' L1 7' L1 7' L1 7' L1  7'        Ankle inversion/eversion AROM 2 x20 2 x20 3 x20 2 x20 2 x20        Ankle DF/PFAROM 2 x20 2 x20 3 x20 2 x20 2 x20        Ankle circles CW/CCW 2 x20 2 x20 3 x20 2 x20 2 x20        Seated Towel Curls             Seated Foot Yoga             Ankle Isometrics: 3-way into ball   Inv/ev/PF  10 x10" each Inv/ev/PF  10 x10" each                      LAQ  2#  x20 2#  x20 2 5#  x20 3#  x20        SLR  2#  2 x10 2#  x20  2 5#  x20        S/L hip ABD   2 x10 x20  2 5#  x20        Prone hamstring curls  2 x10 2 x10 x20 2 5#  x20        Prone straight leg raise  2 x10 2 x10  2 5#  x20        Ther Activity                                       Gait Training             CAM Boot // bars     8'                     Modalities

## 2021-08-18 ENCOUNTER — OFFICE VISIT (OUTPATIENT)
Dept: PHYSICAL THERAPY | Facility: CLINIC | Age: 31
End: 2021-08-18

## 2021-08-18 DIAGNOSIS — S82.871A TRAUMATIC CLOSED DISPLACED FRACTURE OF TIBIAL PLAFOND, RIGHT, INITIAL ENCOUNTER: Primary | ICD-10-CM

## 2021-08-18 PROCEDURE — 97140 MANUAL THERAPY 1/> REGIONS: CPT | Performed by: PHYSICAL THERAPIST

## 2021-08-18 PROCEDURE — 97110 THERAPEUTIC EXERCISES: CPT | Performed by: PHYSICAL THERAPIST

## 2021-08-18 PROCEDURE — 97112 NEUROMUSCULAR REEDUCATION: CPT | Performed by: PHYSICAL THERAPIST

## 2021-08-18 NOTE — PROGRESS NOTES
Daily Note     Today's date: 2021  Patient name: Karla Bernal  : 1990  MRN: [id number]  Referring provider: Farrah Sutherland  Dx:   Encounter Diagnosis     ICD-10-CM    1  Traumatic closed displaced fracture of tibial plafond, right, initial encounter  L53 [id number]                   Subjective: Reports feeling pretty overall, still stiffness and limited movement of R ankle  Objective: See treatment diary below      Assessment: Continues to have limited ankle INV/EV as well as limited DF with firm end feel vs hard  Limited great toe extension as well as leading to toe out ambulation  Will continue with flexibility as able  Plan: Continue per plan of care  Precautions: 4 days of partial weight bearing 50%  Then, 4 days of partial weight bearing 75%  Then, 4 days of partial weight bearing 90%  Then full weight bearing in the boot and wean your crutches/rolling walker      Then 2 weeks of weightbearing as tolerated in the CAM boot      You may begin weaning your boot and transitioning to a sneaker (21)  It is important to do this gradually to avoid aggravating the healing process      1  , you may come out of the boot into a sneaker for 2 hours  2  , you may come out of the boot into a sneaker for 4 hours,  3  The next day, you may come out of the boot into a sneaker for 6 hours  4  Continue this (adding 2 hours per day) as you tolerate  For example, if you do 6 hours out of the boot into a sneaker and your foot swells more than usual at night and it is difficult to control the discomfort, do not advance to 8 hours the next day, stay at 6 hours until you are able to tolerate it      HEP: Ankle Pumps, Ankien inversion, ankle ABC, ankle Circles, Seated Gastroc stretch with towel  HEP(Updated ): SLR, LAQ, prone hip extension, S/L hip ABD  Manuals        R Ankle PROM 30' 23' 20' 23' 13' 25''                                              Neuro Re-Ed Ther Ex             Stationary Bike L1 7' L1 7' L1 7' L1 7' L1  7' L1 7 min       Ankle inversion/eversion AROM 2 x20 2 x20 3 x20 2 x20 2 x20 2x20       Ankle DF/PFAROM 2 x20 2 x20 3 x20 2 x20 2 x20 2x20       Ankle circles CW/CCW 2 x20 2 x20 3 x20 2 x20 2 x20 2x20       Seated Towel Curls             Seated Foot Yoga             Ankle Isometrics: 3-way into ball   Inv/ev/PF  10 x10" each Inv/ev/PF  10 x10" each  NV                    LAQ  2#  x20 2#  x20 2 5#  x20 3#  x20 3# x20       SLR  2#  2 x10 2#  x20  2 5#  x20 2 5# x20       S/L hip ABD   2 x10 x20  2 5#  x20 2 5# x20       Prone hamstring curls  2 x10 2 x10 x20 2 5#  x20 2 5# x20       Prone straight leg raise  2 x10 2 x10  2 5#  x20 2 5# x20       Ther Activity                                       Gait Training             CAM Boot // bars     8' 8'                    Modalities

## 2021-08-20 ENCOUNTER — OFFICE VISIT (OUTPATIENT)
Dept: PHYSICAL THERAPY | Facility: CLINIC | Age: 31
End: 2021-08-20

## 2021-08-20 DIAGNOSIS — S82.871A TRAUMATIC CLOSED DISPLACED FRACTURE OF TIBIAL PLAFOND, RIGHT, INITIAL ENCOUNTER: Primary | ICD-10-CM

## 2021-08-20 PROCEDURE — 97112 NEUROMUSCULAR REEDUCATION: CPT | Performed by: PHYSICAL THERAPIST

## 2021-08-20 PROCEDURE — 97140 MANUAL THERAPY 1/> REGIONS: CPT | Performed by: PHYSICAL THERAPIST

## 2021-08-20 PROCEDURE — 97110 THERAPEUTIC EXERCISES: CPT | Performed by: PHYSICAL THERAPIST

## 2021-08-20 NOTE — PROGRESS NOTES
Daily Note     Today's date: 2021  Patient name: Kaitlin Woodward  : 1990  MRN: [id number]  Referring provider: Tonio Pozo  Dx:   Encounter Diagnosis     ICD-10-CM    1  Traumatic closed displaced fracture of tibial plafond, right, initial encounter  P16 718B                   Subjective: Notes feeling increased ankle soreness after last session  Objective: See treatment diary below      Assessment: Improved overall ankle mobility into all planes today without sharp discomfort into end ranges  Still with anterior ankle pinching atimes  Able to perform heel toe rocking at times with good response and without sharp discomfort  Plan: Continue per plan of care  progress WBing  Precautions: 4 days of partial weight bearing 50%  Then, 4 days of partial weight bearing 75%  Then, 4 days of partial weight bearing 90%  Then full weight bearing in the boot and wean your crutches/rolling walker      Then 2 weeks of weightbearing as tolerated in the CAM boot      You may begin weaning your boot and transitioning to a sneaker (21)  It is important to do this gradually to avoid aggravating the healing process      1  , you may come out of the boot into a sneaker for 2 hours  2  , you may come out of the boot into a sneaker for 4 hours,  3  The next day, you may come out of the boot into a sneaker for 6 hours  4  Continue this (adding 2 hours per day) as you tolerate  For example, if you do 6 hours out of the boot into a sneaker and your foot swells more than usual at night and it is difficult to control the discomfort, do not advance to 8 hours the next day, stay at 6 hours until you are able to tolerate it      HEP: Ankle Pumps, Ankien inversion, ankle ABC, ankle Circles, Seated Gastroc stretch with towel  HEP(Updated ): SLR, LAQ, prone hip extension, S/L hip ABD  Manuals       R Ankle PROM 30' 23' 20' 23' 13' 25'' 25' Neuro Re-Ed                                                                                                        Ther Ex             Stationary Bike L1 7' L1 7' L1 7' L1 7' L1  7' L1 7 min L1 7 min      Ankle inversion/eversion AROM 2 x20 2 x20 3 x20 2 x20 2 x20 2x20 2x20      Ankle DF/PFAROM 2 x20 2 x20 3 x20 2 x20 2 x20 2x20 2x20      Ankle circles CW/CCW 2 x20 2 x20 3 x20 2 x20 2 x20 2x20 2x20      Seated Towel Curls             Seated Foot Yoga             Ankle Isometrics: 3-way into ball   Inv/ev/PF  10 x10" each Inv/ev/PF  10 x10" each  NV 10x5"                   LAQ  2#  x20 2#  x20 2 5#  x20 3#  x20 3# x20 NV      SLR  2#  2 x10 2#  x20  2 5#  x20 2 5# x20 2 5# x20      S/L hip ABD   2 x10 x20  2 5#  x20 2 5# x20 2 5# x20      Prone hamstring curls  2 x10 2 x10 x20 2 5#  x20 2 5# x20 NV      Prone straight leg raise  2 x10 2 x10  2 5#  x20 2 5# x20 NV      Ther Activity                                       Gait Training             CAM Boot // bars     8' 8'                    Modalities

## 2021-08-27 ENCOUNTER — EVALUATION (OUTPATIENT)
Dept: PHYSICAL THERAPY | Facility: CLINIC | Age: 31
End: 2021-08-27

## 2021-08-27 DIAGNOSIS — S82.871A TRAUMATIC CLOSED DISPLACED FRACTURE OF TIBIAL PLAFOND, RIGHT, INITIAL ENCOUNTER: Primary | ICD-10-CM

## 2021-08-27 PROCEDURE — 97140 MANUAL THERAPY 1/> REGIONS: CPT | Performed by: PHYSICAL THERAPIST

## 2021-08-27 PROCEDURE — 97110 THERAPEUTIC EXERCISES: CPT | Performed by: PHYSICAL THERAPIST
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
## 2021-08-31 ENCOUNTER — TELEPHONE (OUTPATIENT)
Dept: OBGYN CLINIC | Facility: HOSPITAL | Age: 31
End: 2021-08-31

## 2021-08-31 NOTE — TELEPHONE ENCOUNTER
[facility], patient's wife is calling because he is not getting paid through his leave because they are getting information stating that the patient is not fully disabled but patient believes he is not clear by Dr Marc Dorantes to go back to work  [facility] would like someone to call her  back  She is not sure if there was something that was not completed  [facility] would like a call to find out exactly what was reported to Fede on 8/18/21 which was the last info logged         [phone]

## 2021-09-01 ENCOUNTER — OFFICE VISIT (OUTPATIENT)
Dept: PHYSICAL THERAPY | Facility: CLINIC | Age: 31
End: 2021-09-01

## 2021-09-01 DIAGNOSIS — S82.871A TRAUMATIC CLOSED DISPLACED FRACTURE OF TIBIAL PLAFOND, RIGHT, INITIAL ENCOUNTER: Primary | ICD-10-CM

## 2021-09-01 PROCEDURE — 97140 MANUAL THERAPY 1/> REGIONS: CPT | Performed by: PHYSICAL THERAPIST

## 2021-09-01 PROCEDURE — 97110 THERAPEUTIC EXERCISES: CPT | Performed by: PHYSICAL THERAPIST

## 2021-09-01 NOTE — PROGRESS NOTES
Daily Note     Today's date: 2021  Patient name: Anthony Collins  : 1990  MRN: [id number]  Referring provider: Dandy Mejia  Dx:   Encounter Diagnosis     ICD-10-CM    1  Traumatic closed displaced fracture of tibial plafond, right, initial encounter  [id number]                   Subjective: Notes having F/U with MD this Friday  Still with stiffness but has been walking on his foot more  Objective: See treatment diary below      Assessment: Demonstrates improved passive INV/EV with less discomfort  Now no longer using CAM boot  Still with weakness with performance of INV/EV but less pain  Able to perform STD HR/TR and addition of rocker board  Able to perform step up and step down without difficulty as well  Plan: Continue per plan of care  Precautions: 4 days of partial weight bearing 50%  Then, 4 days of partial weight bearing 75%  Then, 4 days of partial weight bearing 90%  Then full weight bearing in the boot and wean your crutches/rolling walker      Then 2 weeks of weightbearing as tolerated in the CAM boot      You may begin weaning your boot and transitioning to a sneaker (21)  It is important to do this gradually to avoid aggravating the healing process      1  , you may come out of the boot into a sneaker for 2 hours  2  , you may come out of the boot into a sneaker for 4 hours,  3  The next day, you may come out of the boot into a sneaker for 6 hours  4  Continue this (adding 2 hours per day) as you tolerate  For example, if you do 6 hours out of the boot into a sneaker and your foot swells more than usual at night and it is difficult to control the discomfort, do not advance to 8 hours the next day, stay at 6 hours until you are able to tolerate it      HEP: Ankle Pumps, Ankien inversion, ankle ABC, ankle Circles, Seated Gastroc stretch with towel    EPOC: 10/22/21  Manuals     R Ankle PROM 30' 23' 20' 23' 13' 25'' 25' 25' 25'            PF PF                              Neuro Re-Ed                                                                                                        Ther Ex             Stationary Bike L1 7' L1 7' L1 7' L1 7' L1  7' L1 7 min L1 7 min 5 min 5 min    Ankle inversion/eversion AROM 2 x20 2 x20 3 x20 2 x20 2 x20 2x20 2x20 20x 20x     Ankle DF/PFAROM 2 x20 2 x20 3 x20 2 x20 2 x20 2x20 2x20 20x 20x    Ankle circles CW/CCW 2 x20 2 x20 3 x20 2 x20 2 x20 2x20 2x20 20x 20x    Seated Towel Curls             Seated Foot Yoga             Ankle Isometrics: 3-way into ball   Inv/ev/PF  10 x10" each Inv/ev/PF  10 x10" each  NV 10x5" NV 10x5"                 LAQ  2#  x20 2#  x20 2 5#  x20 3#  x20 3# x20 NV NV     SLR  2#  2 x10 2#  x20  2 5#  x20 2 5# x20 2 5# x20 NV     S/L hip ABD   2 x10 x20  2 5#  x20 2 5# x20 2 5# x20 NV     Prone hamstring curls  2 x10 2 x10 x20 2 5#  x20 2 5# x20 NV NV     Prone straight leg raise  2 x10 2 x10  2 5#  x20 2 5# x20 NV NV     STD hip 3 way         OTB 2x10    Step up/down             Ther Activity             TM lunge walking        3 min                  Gait Training             CAM Boot // bars     8' 8'                    Modalities

## 2021-09-03 ENCOUNTER — OFFICE VISIT (OUTPATIENT)
Dept: OBGYN CLINIC | Facility: CLINIC | Age: 31
End: 2021-09-03

## 2021-09-03 ENCOUNTER — APPOINTMENT (OUTPATIENT)
Dept: RADIOLOGY | Facility: AMBULARY SURGERY CENTER | Age: 31
End: 2021-09-03
Attending: ORTHOPAEDIC SURGERY

## 2021-09-03 ENCOUNTER — OFFICE VISIT (OUTPATIENT)
Dept: PHYSICAL THERAPY | Facility: CLINIC | Age: 31
End: 2021-09-03

## 2021-09-03 VITALS
HEIGHT: 70 IN | BODY MASS INDEX: 33.07 KG/M2 | DIASTOLIC BLOOD PRESSURE: 88 MMHG | HEART RATE: 87 BPM | SYSTOLIC BLOOD PRESSURE: 124 MMHG | RESPIRATION RATE: 17 BRPM | WEIGHT: 231 LBS

## 2021-09-03 DIAGNOSIS — M25.571 PAIN, JOINT, ANKLE AND FOOT, RIGHT: ICD-10-CM

## 2021-09-03 DIAGNOSIS — M25.571 PAIN, JOINT, ANKLE AND FOOT, RIGHT: Primary | ICD-10-CM

## 2021-09-03 DIAGNOSIS — S82.871A TRAUMATIC CLOSED DISPLACED FRACTURE OF TIBIAL PLAFOND, RIGHT, INITIAL ENCOUNTER: Primary | ICD-10-CM

## 2021-09-03 PROCEDURE — 73610 X-RAY EXAM OF ANKLE: CPT

## 2021-09-03 PROCEDURE — 97140 MANUAL THERAPY 1/> REGIONS: CPT | Performed by: PHYSICAL THERAPIST

## 2021-09-03 PROCEDURE — 99024 POSTOP FOLLOW-UP VISIT: CPT | Performed by: ORTHOPAEDIC SURGERY

## 2021-09-03 PROCEDURE — 97110 THERAPEUTIC EXERCISES: CPT | Performed by: PHYSICAL THERAPIST

## 2021-09-03 NOTE — PROGRESS NOTES
Norris Ahumada, M D  Attending, Orthopaedic Surgery  Foot and Ankle  [facility]      ORTHOPAEDIC FOOT AND ANKLE POST-OP VISIT     Procedure:     Right posterior malleolus fracture ORIF       Date of surgery:   6/7/21      PLAN  1  Weightbearing Status- WBAT operative extremity  2  DVT prophylaxis-  BID completed  3  Continue PT until [facility] by the therapist  4  Pain control- OTC pain medication  5  RTC in 3 month(s)  6  Xrays needed next visit - yes Weightbearing Ankle    History of Present Illness:   Chief Complaint:   Chief Complaint   Patient presents with    Right Ankle - Pain, Follow-up     Mayra Donald is a 27 y o  male who is being seen for post-operative visit for the above procedure  Pain is well controlled and the patient has successfully transitioned to OTC pain medicines  he was taking ASA 325mg BID for DVT prophylaxis  Patient has been WBAT in a sneaker  Review of Systems:  General- denies fever/chills  Respiratory- denies cough or SOB  Cardio- denies chest pain or palpitations  GI- denies abdominal pain  Musculoskeletal- Negative except noted above  Skin- denies rashes or wounds    Physical Exam:   There were no vitals taken for this visit  General/Constitutional: No apparent distress: well-nourished and well developed  Eyes: normal ocular motion  Lymphatic: No appreciable lymphadenopathy  Respiratory: Non-labored breathing  Vascular: No edema, swelling or tenderness, except as noted in detailed exam   Integumentary: No impressive skin lesions present, except as noted in detailed exam   Neuro: No ataxia or tremors noted  Psych: Normal mood and affect, oriented to person, place and time  Appropriate affect  Musculoskeletal: Normal, except as noted in detailed exam and in HPI  Examination    right        Incision Clean, dry, intact  Sutures Previously removed      Ecchymosis none    Swelling Mild    Sensation Intact to light touch throughout sural, saphenous, superficial peroneal, deep peroneal and medial/lateral plantar nerve distributions  Stout-Camelia 5 07 filament (10g) testing deferred  Cardiovascular Brisk capillary refill < 2 seconds,intact DP and PT pulses    Special Tests None      Imaging Studies:   3 views of the right ankle were taken, reviewed and interpreted independently that demonstrate hardware in expected position without signs of failure or loosening  Reviewed by me personally  Imelda Lee Lachman, MD  Foot & Ankle Surgery   Department of [address] [place] [address]      I personally performed the service  Imelda Lee Lachman, MD

## 2021-09-03 NOTE — PROGRESS NOTES
Daily Note     Today's date: 9/3/2021  Patient name: Debbie Padilla  : 1990  MRN: [id number]  Referring provider: Rosa Rahman  Dx:   Encounter Diagnosis     ICD-10-CM    1  Traumatic closed displaced fracture of tibial plafond, right, initial encounter  [id number]                   Subjective: Notes feeling better, had F/U with MD and cleared to return to work as a   Objective: See treatment diary below      Assessment: Demonstrates improved endurance with SLS and HR B/L on slant board  Still with limited ability to perform SL HR  Progress with performance of SL HR on leg press up to 35 lbs without sharp pain  Continued improvement in ankle INV/EV strength  Plan: Continue per plan of care  Precautions: 4 days of partial weight bearing 50%  Then, 4 days of partial weight bearing 75%  Then, 4 days of partial weight bearing 90%  Then full weight bearing in the boot and wean your crutches/rolling walker      Then 2 weeks of weightbearing as tolerated in the CAM boot      You may begin weaning your boot and transitioning to a sneaker (21)  It is important to do this gradually to avoid aggravating the healing process      1  , you may come out of the boot into a sneaker for 2 hours  2  , you may come out of the boot into a sneaker for 4 hours,  3  The next day, you may come out of the boot into a sneaker for 6 hours  4  Continue this (adding 2 hours per day) as you tolerate  For example, if you do 6 hours out of the boot into a sneaker and your foot swells more than usual at night and it is difficult to control the discomfort, do not advance to 8 hours the next day, stay at 6 hours until you are able to tolerate it      HEP: Ankle Pumps, Ankien inversion, ankle ABC, ankle Circles, Seated Gastroc stretch with towel    EPOC: 10/22/21  Manuals  8/4 8/6 8/11 8/13 8/18 8/20 8/27 9/1 9/3   R Ankle PROM 30' 23' 20' 23' 13' 25'' 25' 25' 25' 15'           PF PF PF                             Neuro Re-Ed                                                                                                        Ther Ex             Stationary Bike L1 7' L1 7' L1 7' L1 7' L1  7' L1 7 min L1 7 min 5 min 5 min 5 min   Ankle inversion/eversion AROM 2 x20 2 x20 3 x20 2 x20 2 x20 2x20 2x20 20x 20x  20x   Ankle DF/PFAROM 2 x20 2 x20 3 x20 2 x20 2 x20 2x20 2x20 20x 20x 20x   Ankle circles CW/CCW 2 x20 2 x20 3 x20 2 x20 2 x20 2x20 2x20 20x 20x 20x   Seated Towel Curls             Leg press          35 lbs 2x10   Seated Foot Yoga             Ankle Isometrics: 3-way into ball   Inv/ev/PF  10 x10" each Inv/ev/PF  10 x10" each  NV 10x5" NV 10x5" 10x5"                LAQ  2#  x20 2#  x20 2 5#  x20 3#  x20 3# x20 NV NV     SLR  2#  2 x10 2#  x20  2 5#  x20 2 5# x20 2 5# x20 NV     S/L hip ABD   2 x10 x20  2 5#  x20 2 5# x20 2 5# x20 NV     Prone hamstring curls  2 x10 2 x10 x20 2 5#  x20 2 5# x20 NV NV     Prone straight leg raise  2 x10 2 x10  2 5#  x20 2 5# x20 NV NV     STD hip 3 way         OTB 2x10 OTB 2x10   Step up/down          2x10 ea   Ther Activity             TM lunge walking        3 min  3 min                Gait Training             CAM Boot // bars     8' 8'                    Modalities

## 2021-09-03 NOTE — LETTER
September 3, 2021     Patient: Prudence Nuñez   YOB: 1990   Date of Visit: 9/3/2021       To Whom it May Concern:    Prudence Nuñez is under my professional care  He was seen in my office on 9/3/2021  He may return to work on Monday 9/06/2021 without restrictions  If you have any questions or concerns, please don't hesitate to call  Sincerely,          Jannet Alfaro MD        CC:  Prudence Nuñez

## 2021-09-08 ENCOUNTER — OFFICE VISIT (OUTPATIENT)
Dept: PHYSICAL THERAPY | Facility: CLINIC | Age: 31
End: 2021-09-08

## 2021-09-08 DIAGNOSIS — S82.871A TRAUMATIC CLOSED DISPLACED FRACTURE OF TIBIAL PLAFOND, RIGHT, INITIAL ENCOUNTER: Primary | ICD-10-CM

## 2021-09-08 PROCEDURE — 97140 MANUAL THERAPY 1/> REGIONS: CPT | Performed by: PHYSICAL THERAPIST

## 2021-09-08 PROCEDURE — 97110 THERAPEUTIC EXERCISES: CPT | Performed by: PHYSICAL THERAPIST

## 2021-09-08 PROCEDURE — 97112 NEUROMUSCULAR REEDUCATION: CPT | Performed by: PHYSICAL THERAPIST

## 2021-09-08 NOTE — PROGRESS NOTES
Daily Note     Today's date: 2021  Patient name: Dionicio Murphy  : 1990  MRN: [id number]  Referring provider: Anuel Au  Dx:   Encounter Diagnosis     ICD-10-CM    1  Traumatic closed displaced fracture of tibial plafond, right, initial encounter  [id number]                   Subjective: Reports being able to return to work on 21 as a [employer]   Notes being able to complete two shifts without limited ability to perform duties  Still pain and discomfort at anterior talocrural joint with WBing  Objective: See treatment diary below      Assessment:  Noting improved ability to perform calcaneal EV today post calcaneal mobilization as well as mid foot medial mobilization with movement with step lunge and EV bias  Able to perform TM incline lunge with improved roll over but still with genu recurvatum and terminal stance on R LE  In SLS increased hip strategy  Will continue to benefit from mobilization and stability as able- pt wishes to return to gym routine  Still with limited PF strength on R LE- 35 lbs SL leg press  Plan: Continue per plan of care  Progress with SLS and hip stability in CKC     Precautions: 4 days of partial weight bearing 50%  Then, 4 days of partial weight bearing 75%  Then, 4 days of partial weight bearing 90%  Then full weight bearing in the boot and wean your crutches/rolling walker      Then 2 weeks of weightbearing as tolerated in the CAM boot      You may begin weaning your boot and transitioning to a sneaker (21)  It is important to do this gradually to avoid aggravating the healing process      1  , you may come out of the boot into a sneaker for 2 hours  2  , you may come out of the boot into a sneaker for 4 hours,  3  The next day, you may come out of the boot into a sneaker for 6 hours  4  Continue this (adding 2 hours per day) as you tolerate   For example, if you do 6 hours out of the boot into a sneaker and your foot swells more than usual at night and it is difficult to control the discomfort, do not advance to 8 hours the next day, stay at 6 hours until you are able to tolerate it      HEP: Ankle Pumps, Ankien inversion, ankle ABC, ankle Circles, Seated Gastroc stretch with towel    EPOC: 10/22/21  Manuals 9/8/21 8/4 8/6 8/11 8/13 8/18 8/20 8/27 9/1 9/3   R Ankle PROM 15' 23' 20' 23' 13' 25'' 25' 25' 25' 15'    PF       PF PF PF                             Neuro Re-Ed             STD hip 3 way without UE support 10x ea                                                                                           Ther Ex             Stationary Bike 5 min L1 7' L1 7' L1 7' L1  7' L1 7 min L1 7 min 5 min 5 min 5 min   Ankle inversion/eversion AROM 20x ea 2 x20 3 x20 2 x20 2 x20 2x20 2x20 20x 20x  20x   Ankle DF/PFAROM 20x ea 2 x20 3 x20 2 x20 2 x20 2x20 2x20 20x 20x 20x   Ankle circles CW/CCW 20x ea 2 x20 3 x20 2 x20 2 x20 2x20 2x20 20x 20x 20x   Seated Towel Curls             Leg press and HR 35 lbs SL HR         35 lbs 2x10   Seated Foot Yoga             Ankle Isometrics: 3-way into ball 10x 5" INV/EV  Inv/ev/PF  10 x10" each Inv/ev/PF  10 x10" each  NV 10x5" NV 10x5" 10x5"                LAQ  2#  x20 2#  x20 2 5#  x20 3#  x20 3# x20 NV NV     SLR  2#  2 x10 2#  x20  2 5#  x20 2 5# x20 2 5# x20 NV     S/L hip ABD   2 x10 x20  2 5#  x20 2 5# x20 2 5# x20 NV     Prone hamstring curls  2 x10 2 x10 x20 2 5#  x20 2 5# x20 NV NV     Prone straight leg raise  2 x10 2 x10  2 5#  x20 2 5# x20 NV NV     STD hip 3 way 2x10 without UE        OTB 2x10 OTB 2x10                Step up/down 20x ea         2x10 ea   Ther Activity             TM lunge walking 3 min incline 3 min level       3 min  3 min   High knees/butt kicker/side shuffle/carioca 5 mins            Gait Training             CAM Boot // bars     8' 8'                    Modalities

## 2021-09-10 ENCOUNTER — APPOINTMENT (OUTPATIENT)
Dept: PHYSICAL THERAPY | Facility: CLINIC | Age: 31
End: 2021-09-10

## 2021-09-15 ENCOUNTER — OFFICE VISIT (OUTPATIENT)
Dept: PHYSICAL THERAPY | Facility: CLINIC | Age: 31
End: 2021-09-15

## 2021-09-15 DIAGNOSIS — S82.871A TRAUMATIC CLOSED DISPLACED FRACTURE OF TIBIAL PLAFOND, RIGHT, INITIAL ENCOUNTER: Primary | ICD-10-CM

## 2021-09-15 PROCEDURE — 97110 THERAPEUTIC EXERCISES: CPT | Performed by: PHYSICAL THERAPIST

## 2021-09-15 PROCEDURE — 97112 NEUROMUSCULAR REEDUCATION: CPT | Performed by: PHYSICAL THERAPIST

## 2021-09-15 PROCEDURE — 97140 MANUAL THERAPY 1/> REGIONS: CPT | Performed by: PHYSICAL THERAPIST

## 2021-09-15 NOTE — PROGRESS NOTES
Daily Note     Today's date: 9/15/2021  Patient name: Neo Montalvo  : 1990  MRN: [id number]  Referring provider: Stevenson Doyle  Dx:   Encounter Diagnosis     ICD-10-CM    1  Traumatic closed displaced fracture of tibial plafond, right, initial encounter  [id number]                   Subjective: Notes feeling increased soreness and discomfort after work on the [employer] the last few days  Still with discomfort at dorsal ankle talocrural region  Objective: See treatment diary below      Assessment: Improved sxs post R ankle talocrural mobilization and light cavitation with performance of inferior mobilization and AP glide grade 3  Able to perform added EFX today- still with genu recurvatum with terminal stance and early heel rise secondary to continued limited DF ROM  Continued anterior displaced talus leading to anteiror impingment  Plan: Continue per plan of care  Precautions: 4 days of partial weight bearing 50%  Then, 4 days of partial weight bearing 75%  Then, 4 days of partial weight bearing 90%  Then full weight bearing in the boot and wean your crutches/rolling walker      Then 2 weeks of weightbearing as tolerated in the CAM boot      You may begin weaning your boot and transitioning to a sneaker (/)  It is important to do this gradually to avoid aggravating the healing process      1  , you may come out of the boot into a sneaker for 2 hours  2  , you may come out of the boot into a sneaker for 4 hours,  3  The next day, you may come out of the boot into a sneaker for 6 hours  4  Continue this (adding 2 hours per day) as you tolerate  For example, if you do 6 hours out of the boot into a sneaker and your foot swells more than usual at night and it is difficult to control the discomfort, do not advance to 8 hours the next day, stay at 6 hours until you are able to tolerate it      HEP: Ankle Pumps, Ankien inversion, ankle ABC, ankle Circles, Seated Gastroc stretch with towel    EPOC: 10/22/21  Manuals 9/8/21 9/15 8/6 8/11 8/13 8/18 8/20 8/27 9/1 9/3   R Ankle PROM 15' 15' 20' 23' 13' 25'' 25' 25' 25' 15'    PF PF      PF PF PF                             Neuro Re-Ed             STD hip 3 way without UE support 10x ea  20x ea                                                                                         Ther Ex             Stationary Bike 5 min 5 min L1 7' L1 7' L1  7' L1 7 min L1 7 min 5 min 5 min 5 min   Ankle inversion/eversion AROM 20x ea 20x ea 3 x20 2 x20 2 x20 2x20 2x20 20x 20x  20x   Ankle DF/PFAROM 20x ea 20x ea 3 x20 2 x20 2 x20 2x20 2x20 20x 20x 20x   Ankle circles CW/CCW 20x ea 20x ea 3 x20 2 x20 2 x20 2x20 2x20 20x 20x 20x   Seated Towel Curls             Leg press and HR 35 lbs SL HR NV        35 lbs 2x10   Seated Foot Yoga             Ankle Isometrics: 3-way into ball 10x 5" INV/EV 10x5" Inv/ev/PF  10 x10" each Inv/ev/PF  10 x10" each  NV 10x5" NV 10x5" 10x5"                LAQ   2#  x20 2 5#  x20 3#  x20 3# x20 NV NV     SLR   2#  x20  2 5#  x20 2 5# x20 2 5# x20 NV     S/L hip ABD   x20  2 5#  x20 2 5# x20 2 5# x20 NV     Prone hamstring curls   2 x10 x20 2 5#  x20 2 5# x20 NV NV     Prone straight leg raise   2 x10  2 5#  x20 2 5# x20 NV NV     STD hip 3 way 2x10 without UE        OTB 2x10 OTB 2x10   Step lunge  20x ea           Step up/down 20x ea 20x ea        2x10 ea   Ther Activity             TM lunge walking 3 min incline 3 min level 6 min      3 min  3 min   High knees/butt kicker/side shuffle/carioca 5 mins 5 min           Gait Training             CAM Boot // bars     8' 8'                    Modalities

## 2021-09-17 ENCOUNTER — OFFICE VISIT (OUTPATIENT)
Dept: PHYSICAL THERAPY | Facility: CLINIC | Age: 31
End: 2021-09-17

## 2021-09-17 DIAGNOSIS — S82.871A TRAUMATIC CLOSED DISPLACED FRACTURE OF TIBIAL PLAFOND, RIGHT, INITIAL ENCOUNTER: Primary | ICD-10-CM

## 2021-09-17 PROCEDURE — 97140 MANUAL THERAPY 1/> REGIONS: CPT | Performed by: PHYSICAL THERAPIST

## 2021-09-17 PROCEDURE — 97110 THERAPEUTIC EXERCISES: CPT | Performed by: PHYSICAL THERAPIST

## 2021-09-17 NOTE — PROGRESS NOTES
Daily Note     Today's date: 2021  Patient name: Uriah Sanchez  : 1990  MRN: [id number]  Referring provider: Michell Massey  Dx:   Encounter Diagnosis     ICD-10-CM    1  Traumatic closed displaced fracture of tibial plafond, right, initial encounter  [id number]                   Subjective: Reports feeling continued soreness and discomfort with working on the [employer]  Objective: See treatment diary below      Assessment: Limited talocrural and subtalar mobility today- improved post mobilization techniques for AP and ML  Able to perform SL HR on leg press for 15 lbs  Improved ability to perform PF and ankle circles  Plan: Continue per plan of care  Precautions: 4 days of partial weight bearing 50%  Then, 4 days of partial weight bearing 75%  Then, 4 days of partial weight bearing 90%  Then full weight bearing in the boot and wean your crutches/rolling walker      Then 2 weeks of weightbearing as tolerated in the CAM boot      You may begin weaning your boot and transitioning to a sneaker (21)  It is important to do this gradually to avoid aggravating the healing process      1  , you may come out of the boot into a sneaker for 2 hours  2  , you may come out of the boot into a sneaker for 4 hours,  3  The next day, you may come out of the boot into a sneaker for 6 hours  4  Continue this (adding 2 hours per day) as you tolerate  For example, if you do 6 hours out of the boot into a sneaker and your foot swells more than usual at night and it is difficult to control the discomfort, do not advance to 8 hours the next day, stay at 6 hours until you are able to tolerate it      HEP: Ankle Pumps, Ankien inversion, ankle ABC, ankle Circles, Seated Gastroc stretch with towel    EPOC: 10/22/21  Manuals 9/8/21 9/15 9/17 8/11 8/13 8/18 8/20 8/27 9/1 9/3   R Ankle PROM 15' 15' 25' 23' 13' 25'' 25' 25' 25' 15'    PF PF PF     PF PF PF                             Neuro Re-Ed STD hip 3 way without UE support 10x ea  20x ea                                                                                         Ther Ex             Stationary Bike 5 min 5 min 5 min L1 7' L1  7' L1 7 min L1 7 min 5 min 5 min 5 min   Ankle inversion/eversion AROM 20x ea 20x ea 20x ea 2 x20 2 x20 2x20 2x20 20x 20x  20x   Ankle DF/PFAROM 20x ea 20x ea 20x ea 2 x20 2 x20 2x20 2x20 20x 20x 20x   Ankle circles CW/CCW 20x ea 20x ea 20x ea 2 x20 2 x20 2x20 2x20 20x 20x 20x   Seated Towel Curls             Leg press and HR 35 lbs SL HR NV 35 lbs SL HR       35 lbs 2x10   Seated Foot Yoga             Ankle Isometrics: 3-way into ball 10x 5" INV/EV 10x5" 10x5" Inv/ev/PF  10 x10" each  NV 10x5" NV 10x5" 10x5"                LAQ    2 5#  x20 3#  x20 3# x20 NV NV     SLR     2 5#  x20 2 5# x20 2 5# x20 NV     S/L hip ABD     2 5#  x20 2 5# x20 2 5# x20 NV     Prone hamstring curls    x20 2 5#  x20 2 5# x20 NV NV     Prone straight leg raise     2 5#  x20 2 5# x20 NV NV     STD hip 3 way 2x10 without UE        OTB 2x10 OTB 2x10   Step lunge  20x ea           Step up/down 20x ea 20x ea        2x10 ea   Ther Activity             TM lunge walking 3 min incline 3 min level 6 min      3 min  3 min   High knees/butt kicker/side shuffle/carioca 5 mins 5 min           Gait Training             CAM Boot // bars     8' 8'                    Modalities

## 2021-09-22 ENCOUNTER — OFFICE VISIT (OUTPATIENT)
Dept: PHYSICAL THERAPY | Facility: CLINIC | Age: 31
End: 2021-09-22

## 2021-09-22 DIAGNOSIS — S82.871A TRAUMATIC CLOSED DISPLACED FRACTURE OF TIBIAL PLAFOND, RIGHT, INITIAL ENCOUNTER: Primary | ICD-10-CM

## 2021-09-22 PROCEDURE — 97110 THERAPEUTIC EXERCISES: CPT | Performed by: PHYSICAL THERAPIST

## 2021-09-22 PROCEDURE — 97140 MANUAL THERAPY 1/> REGIONS: CPT | Performed by: PHYSICAL THERAPIST

## 2021-09-22 NOTE — PROGRESS NOTES
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  Objective: See treatment diary below      Assessment: Improved overall ankle flexibility in all planes including subtalar joint  Most pain with resisted PF and EV leading to lateral calcaneal pain consistent with peroneal longus tendon inflammation  Improved post repeated isometrics and subtalar mobilization  Improved ankle INV strength ~ 4+/5 now  EV ~ 4+/5 as well  Plan: Continue per plan of care  Precautions: 4 days of partial weight bearing 50%  Then, 4 days of partial weight bearing 75%  Then, 4 days of partial weight bearing 90%  Then full weight bearing in the boot and wean your crutches/rolling walker      Then 2 weeks of weightbearing as tolerated in the CAM boot      You may begin weaning your boot and transitioning to a sneaker (21)  It is important to do this gradually to avoid aggravating the healing process      1  , you may come out of the boot into a sneaker for 2 hours  2  , you may come out of the boot into a sneaker for 4 hours,  3  The next day, you may come out of the boot into a sneaker for 6 hours  4  Continue this (adding 2 hours per day) as you tolerate  For example, if you do 6 hours out of the boot into a sneaker and your foot swells more than usual at night and it is difficult to control the discomfort, do not advance to 8 hours the next day, stay at 6 hours until you are able to tolerate it      HEP: Ankle Pumps, Ankien inversion, ankle ABC, ankle Circles, Seated Gastroc stretch with towel    EPOC: 10/22/21  Manuals 9/8/21 9/15 9/17 9/22 8/13 8/18 8/20 8/27 9/1 9/3   R Ankle PROM 15' 15' 25' 25' 13' 25'' 25' 25' 25' 15'    PF PF PF PF    PF PF PF                             Neuro Re-Ed             STD hip 3 way without UE support 10x ea  20x ea  20x ea                                                                                       Ther Ex             Stationary Bike 5 min 5 min 5 min 5 min L1  7' L1 7 min L1 7 min 5 min 5 min 5 min   Ankle inversion/eversion AROM 20x ea 20x ea 20x ea 20x ea 2 x20 2x20 2x20 20x 20x  20x   Ankle DF/PFAROM 20x ea 20x ea 20x ea 20x ea 2 x20 2x20 2x20 20x 20x 20x   Ankle circles CW/CCW 20x ea 20x ea 20x ea 20x ea 2 x20 2x20 2x20 20x 20x 20x   Seated Towel Curls             Leg press and HR 35 lbs SL HR NV 35 lbs SL HR 35 lbs SL HR      35 lbs 2x10   Seated Foot Yoga             Ankle Isometrics: 3-way into ball 10x 5" INV/EV 10x5" 10x5" 10x5"  NV 10x5" NV 10x5" 10x5"                LAQ     3#  x20 3# x20 NV NV     SLR     2 5#  x20 2 5# x20 2 5# x20 NV     S/L hip ABD     2 5#  x20 2 5# x20 2 5# x20 NV     Prone hamstring curls     2 5#  x20 2 5# x20 NV NV     Prone straight leg raise     2 5#  x20 2 5# x20 NV NV     STD hip 3 way 2x10 without UE        OTB 2x10 OTB 2x10   Step lunge  20x ea  20x          Step up/down 20x ea 20x ea  20x e      2x10 ea   Ther Activity             TM lunge walking 3 min incline 3 min level 6 min  5 min    3 min  3 min   High knees/butt kicker/side shuffle/carioca 5 mins 5 min           Gait Training             CAM Boot // bars     8' 8'                    Modalities

## 2021-09-28 ENCOUNTER — EVALUATION (OUTPATIENT)
Dept: PHYSICAL THERAPY | Facility: CLINIC | Age: 31
End: 2021-09-28

## 2021-09-28 DIAGNOSIS — S82.871A TRAUMATIC CLOSED DISPLACED FRACTURE OF TIBIAL PLAFOND, RIGHT, INITIAL ENCOUNTER: Primary | ICD-10-CM

## 2021-09-28 PROCEDURE — 97110 THERAPEUTIC EXERCISES: CPT | Performed by: PHYSICAL THERAPIST

## 2021-09-28 PROCEDURE — 97164 PT RE-EVAL EST PLAN CARE: CPT | Performed by: PHYSICAL THERAPIST

## 2021-09-28 PROCEDURE — 97140 MANUAL THERAPY 1/> REGIONS: CPT | Performed by: PHYSICAL THERAPIST
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
## 2021-10-08 ENCOUNTER — OFFICE VISIT (OUTPATIENT)
Dept: PHYSICAL THERAPY | Facility: CLINIC | Age: 31
End: 2021-10-08

## 2021-10-08 DIAGNOSIS — S82.871A TRAUMATIC CLOSED DISPLACED FRACTURE OF TIBIAL PLAFOND, RIGHT, INITIAL ENCOUNTER: Primary | ICD-10-CM

## 2021-10-08 PROCEDURE — 97112 NEUROMUSCULAR REEDUCATION: CPT | Performed by: PHYSICAL THERAPIST

## 2021-10-08 PROCEDURE — 97140 MANUAL THERAPY 1/> REGIONS: CPT | Performed by: PHYSICAL THERAPIST

## 2021-10-08 PROCEDURE — 97110 THERAPEUTIC EXERCISES: CPT | Performed by: PHYSICAL THERAPIST

## 2021-10-13 ENCOUNTER — OFFICE VISIT (OUTPATIENT)
Dept: PHYSICAL THERAPY | Facility: CLINIC | Age: 31
End: 2021-10-13

## 2021-10-13 DIAGNOSIS — S82.871A TRAUMATIC CLOSED DISPLACED FRACTURE OF TIBIAL PLAFOND, RIGHT, INITIAL ENCOUNTER: Primary | ICD-10-CM

## 2021-10-13 PROCEDURE — 97112 NEUROMUSCULAR REEDUCATION: CPT

## 2021-10-13 PROCEDURE — 97140 MANUAL THERAPY 1/> REGIONS: CPT

## 2021-10-13 PROCEDURE — 97110 THERAPEUTIC EXERCISES: CPT

## 2021-10-15 ENCOUNTER — APPOINTMENT (OUTPATIENT)
Dept: PHYSICAL THERAPY | Facility: CLINIC | Age: 31
End: 2021-10-15

## 2021-11-12 ENCOUNTER — EVALUATION (OUTPATIENT)
Dept: PHYSICAL THERAPY | Facility: CLINIC | Age: 31
End: 2021-11-12

## 2021-11-12 DIAGNOSIS — S82.871A TRAUMATIC CLOSED DISPLACED FRACTURE OF TIBIAL PLAFOND, RIGHT, INITIAL ENCOUNTER: Primary | ICD-10-CM

## 2021-11-12 PROCEDURE — 97140 MANUAL THERAPY 1/> REGIONS: CPT | Performed by: PHYSICAL THERAPIST

## 2021-11-12 PROCEDURE — 97110 THERAPEUTIC EXERCISES: CPT | Performed by: PHYSICAL THERAPIST

## 2021-11-15 ENCOUNTER — OFFICE VISIT (OUTPATIENT)
Dept: PHYSICAL THERAPY | Facility: CLINIC | Age: 31
End: 2021-11-15

## 2021-11-15 DIAGNOSIS — S82.871A TRAUMATIC CLOSED DISPLACED FRACTURE OF TIBIAL PLAFOND, RIGHT, INITIAL ENCOUNTER: Primary | ICD-10-CM

## 2021-11-15 PROCEDURE — 97110 THERAPEUTIC EXERCISES: CPT | Performed by: PHYSICAL THERAPIST

## 2021-11-15 PROCEDURE — 97140 MANUAL THERAPY 1/> REGIONS: CPT | Performed by: PHYSICAL THERAPIST

## 2021-11-15 PROCEDURE — 97112 NEUROMUSCULAR REEDUCATION: CPT | Performed by: PHYSICAL THERAPIST

## 2021-12-10 ENCOUNTER — OFFICE VISIT (OUTPATIENT)
Dept: OBGYN CLINIC | Facility: CLINIC | Age: 31
End: 2021-12-10

## 2021-12-10 ENCOUNTER — APPOINTMENT (OUTPATIENT)
Dept: RADIOLOGY | Facility: CLINIC | Age: 31
End: 2021-12-10

## 2021-12-10 VITALS — WEIGHT: 231 LBS | BODY MASS INDEX: 33.07 KG/M2 | HEIGHT: 70 IN

## 2021-12-10 DIAGNOSIS — S82.871A TRAUMATIC CLOSED DISPLACED FRACTURE OF TIBIAL PLAFOND, RIGHT, INITIAL ENCOUNTER: ICD-10-CM

## 2021-12-10 DIAGNOSIS — S82.871A TRAUMATIC CLOSED DISPLACED FRACTURE OF TIBIAL PLAFOND, RIGHT, INITIAL ENCOUNTER: Primary | ICD-10-CM

## 2021-12-10 PROCEDURE — 99213 OFFICE O/P EST LOW 20 MIN: CPT | Performed by: ORTHOPAEDIC SURGERY

## 2021-12-10 PROCEDURE — 73610 X-RAY EXAM OF ANKLE: CPT

## 2023-01-23 ENCOUNTER — TELEPHONE (OUTPATIENT)
Dept: OBGYN CLINIC | Facility: HOSPITAL | Age: 33
End: 2023-01-23

## 2023-01-23 NOTE — TELEPHONE ENCOUNTER
Caller: Patient    Doctor: Lachman    Reason for call: Patient calls in wondering if Dr Flaco De Jesus can take him out of work  He is having excruciating pain in his right ankle that was operated on 6/7/2021  Please advise       Call back#: [phone]

## 2024-01-10 ENCOUNTER — APPOINTMENT (OUTPATIENT)
Dept: URGENT CARE | Age: 34
End: 2024-01-10

## (undated) DEVICE — 1.25MM NON-THREADED GUIDE WIRE 150MM

## (undated) DEVICE — GLOVE INDICATOR PI UNDERGLOVE SZ 8 BLUE

## (undated) DEVICE — GLOVE INDICATOR PI UNDERGLOVE SZ 7 BLUE

## (undated) DEVICE — BETHLEHEM UNIV MAJ EXT ,KIT: Brand: CARDINAL HEALTH

## (undated) DEVICE — SUT ETHILON 3-0 PS-1 18 IN 1663G

## (undated) DEVICE — SUT VICRYL 2-0 CT-2 27 IN J269H

## (undated) DEVICE — 3M™ DURAPORE™ SURGICAL TAPE 1538-1, 1 INCH X 10 YARD (2,5CM X 9,1M), 12 ROLLS/BOX: Brand: 3M™ DURAPORE™

## (undated) DEVICE — SPONGE SCRUB 4 PCT CHLORHEXIDINE

## (undated) DEVICE — CAST PADDING 6 IN STERILE

## (undated) DEVICE — GLOVE SRG BIOGEL 8

## (undated) DEVICE — PAD GROUNDING ADULT

## (undated) DEVICE — ACE WRAP 6 IN UNSTERILE

## (undated) DEVICE — NEEDLE HYPO 22G X 1-1/2 IN

## (undated) DEVICE — SYRINGE 30ML LL

## (undated) DEVICE — DRAPE C-ARM X-RAY

## (undated) DEVICE — INTENDED FOR TISSUE SEPARATION, AND OTHER PROCEDURES THAT REQUIRE A SHARP SURGICAL BLADE TO PUNCTURE OR CUT.: Brand: BARD-PARKER SAFETY BLADES SIZE 15, STERILE

## (undated) DEVICE — CHLORAPREP HI-LITE 26ML ORANGE

## (undated) DEVICE — SPLINT 5 X 30 IN FAST SET PLASTER

## (undated) DEVICE — INTENDED FOR TISSUE SEPARATION, AND OTHER PROCEDURES THAT REQUIRE A SHARP SURGICAL BLADE TO PUNCTURE OR CUT.: Brand: BARD-PARKER ® CARBON RIB-BACK BLADES

## (undated) DEVICE — 2.0MM DRILL BIT WITH DEPTH MARK/QC/140MM

## (undated) DEVICE — GLOVE SRG BIOGEL 6.5

## (undated) DEVICE — CAST PADDING 4 IN UNSTERILE

## (undated) DEVICE — ABDOMINAL PAD: Brand: DERMACEA

## (undated) DEVICE — SPECIMEN CONTAINER STERILE PEEL PACK

## (undated) DEVICE — DRAPE C-ARMOUR

## (undated) DEVICE — DRESSING XEROFORM 5 X 9

## (undated) DEVICE — SUT VICRYL 4-0 PS-2 18 IN J496G

## (undated) DEVICE — BANDAGE, ESMARK LF STR 6"X9' (20/CS): Brand: CYPRESS

## (undated) DEVICE — PENCIL ELECTROSURG E-Z CLEAN -0035H